# Patient Record
Sex: MALE | Race: WHITE | NOT HISPANIC OR LATINO | Employment: OTHER | URBAN - METROPOLITAN AREA
[De-identification: names, ages, dates, MRNs, and addresses within clinical notes are randomized per-mention and may not be internally consistent; named-entity substitution may affect disease eponyms.]

---

## 2017-01-13 ENCOUNTER — OFFICE VISIT (OUTPATIENT)
Dept: OPHTHALMOLOGY | Facility: CLINIC | Age: 72
End: 2017-01-13
Attending: OPHTHALMOLOGY
Payer: COMMERCIAL

## 2017-01-13 DIAGNOSIS — H40.9 GLAUCOMA: Primary | ICD-10-CM

## 2017-01-13 DIAGNOSIS — H40.1132 PRIMARY OPEN ANGLE GLAUCOMA OF BOTH EYES, MODERATE STAGE: ICD-10-CM

## 2017-01-13 PROCEDURE — 99213 OFFICE O/P EST LOW 20 MIN: CPT | Mod: ZF

## 2017-01-13 PROCEDURE — 92020 GONIOSCOPY: CPT | Mod: ZF | Performed by: OPHTHALMOLOGY

## 2017-01-13 PROCEDURE — 92133 CPTRZD OPH DX IMG PST SGM ON: CPT | Mod: ZF | Performed by: OPHTHALMOLOGY

## 2017-01-13 ASSESSMENT — VISUAL ACUITY
METHOD: SNELLEN - LINEAR
CORRECTION_TYPE: GLASSES
OD_CC: 20/20
OS_CC: 20/20

## 2017-01-13 ASSESSMENT — PACHYMETRY
OD_CT(UM): 552
EXAM_DATE: 1/13/2017
OS_CT(UM): 539

## 2017-01-13 ASSESSMENT — CONF VISUAL FIELD
OD_NORMAL: 1
OS_NORMAL: 1

## 2017-01-13 ASSESSMENT — TONOMETRY
OD_IOP_MMHG: 12
IOP_METHOD: TONOPEN
IOP_METHOD: APPLANATION
OS_IOP_MMHG: 13
OD_IOP_MMHG: 09
OS_IOP_MMHG: 12

## 2017-01-13 ASSESSMENT — REFRACTION_WEARINGRX
OD_CYLINDER: +0.50
OS_AXIS: 80
OD_SPHERE: -2.50
OD_ADD: +2.50
OS_SPHERE: -3.00
OD_AXIS: 100
OS_ADD: +2.50
OS_CYLINDER: +0.75

## 2017-01-13 ASSESSMENT — CUP TO DISC RATIO
OD_RATIO: 0.8
OS_RATIO: 0.75

## 2017-01-13 ASSESSMENT — SLIT LAMP EXAM - LIDS
COMMENTS: NORMAL
COMMENTS: NORMAL

## 2017-01-13 ASSESSMENT — EXTERNAL EXAM - RIGHT EYE: OD_EXAM: NORMAL

## 2017-01-13 ASSESSMENT — EXTERNAL EXAM - LEFT EYE: OS_EXAM: NORMAL

## 2017-01-13 NOTE — PROGRESS NOTES
CC: glaucoma evaluation     HPI: Jhonatan Hewitt is a 71 year old male referred by Dr. Duran here for glaucoma evaluation. Was told he had glaucoma x 15 years and has been on drops x 15 years. Told that his glaucoma has always been fairly stable.   Last VF in Nov 2016 showed OD: nasal step, and inferior arcuate close to fixation; OS: fairly normal but missed a couple of points on last two visual fields.     Highest IOP (per patient): high teens/low 20s.     Brother had glaucoma (on gtts), several first cousins, and some uncles     Past medical hx:   HTN, HLP    Past ocular hx:   CE/IOL with endoscopic CPC 09/2014o right eye     Ocular gtts:   Dorzolamide/timolol BID OU  Brimonidine BID OU  Latanoprost qhs OU     Intolerances: none     OCT RNFL:  OD: PPA, superior and inferior thinning, epiretinal membrane   OS: PPA, inferior thinning       ASSESSMENT/PLAN:   1) POAG, moderate-severe right eye; mild-moderate left eye    - excellent IOPs at 12 OU today   - will continue same drops for now   - will follow up with Dr Duran as before    2) Pseudophakia, right eye   - open posterior cap     3) Nuclear sclerosis, left eye    - not visually significant, continue to observe   - could have cataract extraction if intraocular pressure becomes difficult to control    4) Myopic astigmatism, both eyes    - current Rx up to date.       Jaskaran Baker MD  Ophthalmology resident, PGY-3     Attending Physician Attestation:  Complete documentation of historical and exam elements from today's encounter can be found in the full encounter summary report (not reduplicated in this progress note). I personally obtained the chief complaint(s) and history of present illness. I confirmed and edited asnecessary the review of systems, past medical/surgical history, family history, social history, and examination findings as documented by others; and I examined the patient myself. I personally reviewed the relevant tests, images, and reports  as documented above. I formulated and edited as necessary the assessment and plan and discussed the findings and management plan with the patient and family.  - Tory Ramirez MD 9:42 AM 1/13/2017

## 2017-01-13 NOTE — NURSING NOTE
Chief Complaints and History of Present Illnesses   Patient presents with     Consult For     glaucoma     HPI    Affected eye(s):  Both   Symptoms:        Frequency:  Constant       Do you have eye pain now?:  No      Comments:  Has been on gtts for a long time and states that he is at the highest dose of gtts  States no changes in the va  +watery  Kelsey Ronnaon COT 7:40 AM January 13, 2017

## 2017-02-16 ENCOUNTER — THERAPY VISIT (OUTPATIENT)
Dept: PHYSICAL THERAPY | Facility: CLINIC | Age: 72
End: 2017-02-16
Payer: COMMERCIAL

## 2017-02-16 DIAGNOSIS — M25.512 BILATERAL SHOULDER PAIN: Primary | ICD-10-CM

## 2017-02-16 DIAGNOSIS — M25.551 HIP PAIN, RIGHT: ICD-10-CM

## 2017-02-16 DIAGNOSIS — M25.511 BILATERAL SHOULDER PAIN: Primary | ICD-10-CM

## 2017-02-16 PROCEDURE — 97161 PT EVAL LOW COMPLEX 20 MIN: CPT | Mod: GP | Performed by: PHYSICAL THERAPIST

## 2017-02-16 PROCEDURE — 97110 THERAPEUTIC EXERCISES: CPT | Mod: GP | Performed by: PHYSICAL THERAPIST

## 2017-02-16 ASSESSMENT — ACTIVITIES OF DAILY LIVING (ADL)
SITTING_FOR_15_MINUTES: NO DIFFICULTY AT ALL
HEAVY_WORK: MODERATE DIFFICULTY
GETTING_INTO_AND_OUT_OF_A_BATHTUB: SLIGHT DIFFICULTY
PUTTING_ON_SOCKS_AND_SHOES: EXTREME DIFFICULTY
HOS_ADL_ITEM_SCORE_TOTAL: 50
WALKING_15_MINUTES_OR_GREATER: SLIGHT DIFFICULTY
LIGHT_TO_MODERATE_WORK: NO DIFFICULTY AT ALL
HOS_ADL_SCORE(%): 78.12
TWISTING/PIVOTING_ON_INVOLVED_LEG: SLIGHT DIFFICULTY
GOING_DOWN_1_FLIGHT_OF_STAIRS: NO DIFFICULTY AT ALL
WALKING_DOWN_STEEP_HILLS: SLIGHT DIFFICULTY
WALKING_UP_STEEP_HILLS: MODERATE DIFFICULTY
GOING_UP_1_FLIGHT_OF_STAIRS: SLIGHT DIFFICULTY
STANDING_FOR_15_MINUTES: NO DIFFICULTY AT ALL
GETTING_INTO_AND_OUT_OF_AN_AVERAGE_CAR: SLIGHT DIFFICULTY
HOS_ADL_HIGHEST_POTENTIAL_SCORE: 64
WALKING_APPROXIMATELY_10_MINUTES: SLIGHT DIFFICULTY
HOS_ADL_COUNT: 16
ROLLING_OVER_IN_BED: NO DIFFICULTY AT ALL
RECREATIONAL_ACTIVITIES: SLIGHT DIFFICULTY
STEPPING_UP_AND_DOWN_CURBS: SLIGHT DIFFICULTY
WALKING_INITIALLY: SLIGHT DIFFICULTY

## 2017-02-16 NOTE — MR AVS SNAPSHOT
After Visit Summary   2/16/2017    Jhonatan Hewitt    MRN: [id number]           Patient Information     Date Of Birth          1945        Visit Information        Provider Department      2/16/2017 11:40 AM Keyon Mehta, PT [facility]        Today's Diagnoses     Bilateral shoulder pain    -  1    Hip pain, right           Follow-ups after your visit        Your next 10 appointments already scheduled     Feb 23, 2017 11:00 AM CST   [facility] Extremity with Keyon Mehta, PT   [facility] ([facility] [place])    [address]  [place] [address]   [phone]            Mar 02, 2017 11:00 AM CST   VIKKI Extremity with Keyon Mehta, PT   [facility] ([facility] [place])    [address]  [place] [address]   [phone]            Mar 09, 2017 11:00 AM CST   VIKKI Extremity with Keyon Mehta, PT   [facility] [place] [facility] ([facility] [place])    [address]  [place] [address]   [phone]            Mar 16, 2017  1:30 PM CDT   VIKKI Extremity with Keyon Mehta, PT   [facility] [place] [facility] ([facility] [place])    [address]  [place] [address]   [phone]              Who to contact     If you have questions or need follow up information about today's clinic visit or your schedule please contact [facility] directly at [phone].  Normal or non-critical lab and imaging results will be communicated to you by MyChart, letter or phone within 4 business days after the clinic has received the results. If you do not hear from us within 7 days, please contact the clinic through MyChart or phone. If you have a critical or abnormal lab result, we will notify you by phone as soon as possible.  Submit refill requests through [employer] or call your pharmacy and they will forward the refill  "request to us. Please allow 3 business days for your refill to be completed.          Additional Information About Your Visit        MyChart Information     [employer] lets you send messages to your doctor, view your test results, renew your prescriptions, schedule appointments and more. To sign up, go to [email or web address] . Click on \"Log in\" on the left side of the screen, which will take you to the Welcome page. Then click on \"Sign up Now\" on the right side of the page.     You will be asked to enter the access code listed below, as well as some personal information. Please follow the directions to create your username and password.     Your access code is: [email or web address]  Expires: 3/30/2017  8:30 AM     Your access code will  in 90 days. If you need help or a new code, please call your [facility] clinic or [phone].        Care EveryWhere ID     This is your Care EveryWhere ID. This could be used by other organizations to access your [facility] medical records  [id number]         Blood Pressure from Last 3 Encounters:   14 (!) 131/92   14 120/74    Weight from Last 3 Encounters:   14 112 kg (247 lb)   14 111.4 kg (245 lb 11.2 oz)              We Performed the Following     HC PT EVAL, LOW COMPLEXITY     [facility] INITIAL EVAL REPORT     THERAPEUTIC EXERCISES        Primary Care Provider    Doctor Unknown, MD       No address on file        Thank you!     Thank you for choosing [facility]  for your care. Our goal is always to provide you with excellent care. Hearing back from our patients is one way we can continue to improve our services. Please take a few minutes to complete the written survey that you may receive in the mail after your visit with us. Thank you!             Your Updated Medication List - Protect others around you: Learn how to safely use, store and throw away your medicines at www.disposemymeds.org.          This list is accurate as " of: 2/16/17 12:52 PM.  Always use your most recent med list.                   Brand Name Dispense Instructions for use    ascorbic acid 500 MG Cpcr CR capsule    vitamin C     Take 1,000 mg by mouth daily       ASPIRIN PO      Take 81 mg by mouth daily       atorvastatin 40 MG tablet    LIPITOR         brimonidine 0.2 % ophthalmic solution    ALPHAGAN     1 drop 2 times daily       CIALIS 20 MG tablet   Generic drug:  tadalafil      Four tiems per elias , once weekly       dorzolamide-timolol 2-0.5 % ophthalmic solution    COSOPT         doxycycline 100 MG capsule    VIBRAMYCIN     2 times daily       latanoprost 0.005 % ophthalmic solution    XALATAN         losartan-hydrochlorothiazide 50-12.5 MG per tablet    HYZAAR         metroNIDAZOLE 0.75 % topical gel    METROGEL         vitamin D 42034 UNIT capsule    ERGOCALCIFEROL     twice a week

## 2017-02-16 NOTE — PROGRESS NOTES
[facility] Initial Evaluation      Subjective:    Jhonatan Hewitt is a 71 year old male with a bilateral shoulders condition.  Condition occurred with:  Unknown cause.  Condition occurred: for unknown reasons.  This is a chronic condition  Patient reports a gradual worsening of shoulder weakness beginning several months ago, left is worse than right.  He has had bilateral  rotator cuff repairs in 2002 and 2000.  He c/o difficulty reaching and lifting above shoulder level.  Physical therapy was ordered on 2/14/2017.    Site of Pain: no c/o pain.        Associated symptoms:  Loss of motion/stiffness and loss of strength. Pain is the same all the time.  Symptoms are exacerbated by using arm at shoulder level, using arm overhead and lifting and relieved by nothing.  Since onset symptoms are gradually worsening.  Special testing: none.  Previous treatment: none.    General health as reported by patient is good.                        Jhonatan Hewitt is a 71 year old male with a right hip condition.  Condition occurred with:  Insidious onset.  Condition occurred: for unknown reasons.  This is a new condition  Patient reports a gradual worsening of right hip pain and decreasing ROM.  He cannot put on his own socks.  Left hip was replaced in 2002.  Physical therapy ordered on 2/14/2017.    Patient reports pain:  Joint.    Pain is described as aching and is intermittent and reported as 2/10.  Associated symptoms:  Loss of motion/stiffness and loss of strength. Pain is the same all the time.  Symptoms are exacerbated by activity, certain positions and other (dressing) and relieved by rest.  Since onset symptoms are gradually worsening.  Special tests:  X-ray (none).  Previous treatment: none.    General health as reported by patient is good.                                            Objective:    System                   Shoulder Evaluation:  ROM:  AROM:    Flexion:  Left:  135    Right:  145    Abduction:   Left: 80   Right:  145      External Rotation:  Left:  15    Right:  60            Extension/Internal Rotation:  Left:  T12    Right:  L2    PROM:              External Rotation:  Left:  60                        Strength:        Abduction:  Left: 3/5  Pain:    Right: 5-/5     Pain:  Adduction:  Left: 5/5    Pain:    Right: 5/5     Pain:  Internal Rotation:  Left:5-/5     Pain:    Right: 5-/5     Pain:  External Rotation:   Left:2+/5     Pain:   Right:4+/5     Pain:              Special Tests:    Left shoulder positive for the following special tests:  Rotator cuff tear                              Hip Evaluation  Hip PROM:    Flexion: Left:  Right: 90    Abduction: Left:   Right: 25    Internal Rotation: Left:   Right: <5  External Rotation: Left:   Right: 35        Endfeel: Firm      Hip Strength:        Abduction:  Right: 4-/5   -   Pain:                                 General     ROS    Assessment/Plan:      Patient is a 71 year old male with both sides shoulder complaints.    Patient has the following significant findings with corresponding treatment plan.                Diagnosis:  Shoulder RCT, Hip OA/DJD  Pain -  self management, education and home program  Decreased ROM/flexibility - manual therapy, therapeutic exercise, therapeutic activity and home program  Decreased joint mobility - therapeutic exercise, therapeutic activity and home program  Decreased strength - therapeutic exercise, therapeutic activities and home program  Impaired muscle performance - neuro re-education and home program  Decreased function - therapeutic activities and home program    Therapy Evaluation Codes:   1) History comprised of:   Personal factors that impact the plan of care:      Age.    Comorbidity factors that impact the plan of care are:      Cancer, High blood pressure, Implanted device, Osteoarthritis, Smoking, Stroke and Weakness.     Medications impacting care: Anti-inflammatory and High blood  pressure.  2) Examination of Body Systems comprised of:   Body structures and functions that impact the plan of care:      Hip and Shoulder.   Activity limitations that impact the plan of care are:      Dressing and Lifting.  3) Clinical presentation characteristics are:   Stable/Uncomplicated.  4) Decision-Making    Low complexity using standardized patient assessment instrument and/or measureable assessment of functional outcome.  Cumulative Therapy Evaluation is: Low complexity.    Previous and current functional limitations:  (See Goal Flow Sheet for this information)    Short term and Long term goals: (See Goal Flow Sheet for this information)     Communication ability:  Patient appears to be able to clearly communicate and understand verbal and written communication and follow directions correctly.  Treatment Explanation - The following has been discussed with the patient:   RX ordered/plan of care  Anticipated outcomes  Possible risks and side effects  This patient would benefit from PT intervention to resume normal activities.   Rehab potential is good.    Frequency:  1 X week, once daily  Duration:  for 8 weeks  Discharge Plan:  Achieve all LTG.  Independent in home treatment program.  Reach maximal therapeutic benefit.    Please refer to the daily flowsheet for treatment today, total treatment time and time spent performing 1:1 timed codes.

## 2017-02-23 ENCOUNTER — THERAPY VISIT (OUTPATIENT)
Dept: PHYSICAL THERAPY | Facility: CLINIC | Age: 72
End: 2017-02-23
Payer: COMMERCIAL

## 2017-02-23 DIAGNOSIS — M25.511 BILATERAL SHOULDER PAIN: ICD-10-CM

## 2017-02-23 DIAGNOSIS — M25.512 BILATERAL SHOULDER PAIN: ICD-10-CM

## 2017-02-23 DIAGNOSIS — M25.551 HIP PAIN, RIGHT: ICD-10-CM

## 2017-02-23 PROCEDURE — 97112 NEUROMUSCULAR REEDUCATION: CPT | Mod: GP | Performed by: PHYSICAL THERAPIST

## 2017-02-23 PROCEDURE — 97110 THERAPEUTIC EXERCISES: CPT | Mod: GP | Performed by: PHYSICAL THERAPIST

## 2017-03-02 ENCOUNTER — THERAPY VISIT (OUTPATIENT)
Dept: PHYSICAL THERAPY | Facility: CLINIC | Age: 72
End: 2017-03-02
Payer: COMMERCIAL

## 2017-03-02 DIAGNOSIS — M25.512 BILATERAL SHOULDER PAIN: ICD-10-CM

## 2017-03-02 DIAGNOSIS — M25.551 HIP PAIN, RIGHT: ICD-10-CM

## 2017-03-02 DIAGNOSIS — M25.511 BILATERAL SHOULDER PAIN: ICD-10-CM

## 2017-03-02 PROCEDURE — 97110 THERAPEUTIC EXERCISES: CPT | Mod: GP | Performed by: PHYSICAL THERAPIST

## 2017-03-02 PROCEDURE — 97112 NEUROMUSCULAR REEDUCATION: CPT | Mod: GP | Performed by: PHYSICAL THERAPIST

## 2017-03-09 ENCOUNTER — THERAPY VISIT (OUTPATIENT)
Dept: PHYSICAL THERAPY | Facility: CLINIC | Age: 72
End: 2017-03-09
Payer: COMMERCIAL

## 2017-03-09 DIAGNOSIS — M25.511 BILATERAL SHOULDER PAIN: ICD-10-CM

## 2017-03-09 DIAGNOSIS — M25.512 BILATERAL SHOULDER PAIN: ICD-10-CM

## 2017-03-09 DIAGNOSIS — M25.551 HIP PAIN, RIGHT: ICD-10-CM

## 2017-03-09 PROCEDURE — 97110 THERAPEUTIC EXERCISES: CPT | Mod: GP | Performed by: PHYSICAL THERAPIST

## 2017-03-09 PROCEDURE — 97112 NEUROMUSCULAR REEDUCATION: CPT | Mod: GP | Performed by: PHYSICAL THERAPIST

## 2017-03-16 ENCOUNTER — THERAPY VISIT (OUTPATIENT)
Dept: PHYSICAL THERAPY | Facility: CLINIC | Age: 72
End: 2017-03-16
Payer: COMMERCIAL

## 2017-03-16 DIAGNOSIS — M25.551 HIP PAIN, RIGHT: ICD-10-CM

## 2017-03-16 DIAGNOSIS — M25.512 BILATERAL SHOULDER PAIN: ICD-10-CM

## 2017-03-16 DIAGNOSIS — M25.511 BILATERAL SHOULDER PAIN: ICD-10-CM

## 2017-03-16 PROCEDURE — 97110 THERAPEUTIC EXERCISES: CPT | Mod: GP | Performed by: PHYSICAL THERAPIST

## 2017-03-16 PROCEDURE — 97112 NEUROMUSCULAR REEDUCATION: CPT | Mod: GP | Performed by: PHYSICAL THERAPIST

## 2017-03-29 ENCOUNTER — THERAPY VISIT (OUTPATIENT)
Dept: PHYSICAL THERAPY | Facility: CLINIC | Age: 72
End: 2017-03-29
Payer: COMMERCIAL

## 2017-03-29 DIAGNOSIS — M25.511 BILATERAL SHOULDER PAIN: ICD-10-CM

## 2017-03-29 DIAGNOSIS — M25.551 HIP PAIN, RIGHT: ICD-10-CM

## 2017-03-29 DIAGNOSIS — M25.512 BILATERAL SHOULDER PAIN: ICD-10-CM

## 2017-03-29 PROCEDURE — 97110 THERAPEUTIC EXERCISES: CPT | Mod: GP | Performed by: PHYSICAL THERAPIST

## 2017-04-05 ENCOUNTER — THERAPY VISIT (OUTPATIENT)
Dept: PHYSICAL THERAPY | Facility: CLINIC | Age: 72
End: 2017-04-05
Payer: COMMERCIAL

## 2017-04-05 DIAGNOSIS — M25.551 HIP PAIN, RIGHT: ICD-10-CM

## 2017-04-05 DIAGNOSIS — M25.512 BILATERAL SHOULDER PAIN: ICD-10-CM

## 2017-04-05 DIAGNOSIS — M25.511 BILATERAL SHOULDER PAIN: ICD-10-CM

## 2017-04-05 PROCEDURE — 97112 NEUROMUSCULAR REEDUCATION: CPT | Mod: GP | Performed by: PHYSICAL THERAPIST

## 2017-04-05 PROCEDURE — 97110 THERAPEUTIC EXERCISES: CPT | Mod: GP | Performed by: PHYSICAL THERAPIST

## 2017-04-20 ENCOUNTER — THERAPY VISIT (OUTPATIENT)
Dept: PHYSICAL THERAPY | Facility: CLINIC | Age: 72
End: 2017-04-20
Payer: COMMERCIAL

## 2017-04-20 DIAGNOSIS — M25.512 BILATERAL SHOULDER PAIN: ICD-10-CM

## 2017-04-20 DIAGNOSIS — M25.551 HIP PAIN, RIGHT: ICD-10-CM

## 2017-04-20 DIAGNOSIS — M25.511 BILATERAL SHOULDER PAIN: ICD-10-CM

## 2017-04-20 PROCEDURE — 97112 NEUROMUSCULAR REEDUCATION: CPT | Mod: GP | Performed by: PHYSICAL THERAPIST

## 2017-04-20 PROCEDURE — 97110 THERAPEUTIC EXERCISES: CPT | Mod: GP | Performed by: PHYSICAL THERAPIST

## 2017-04-20 NOTE — PROGRESS NOTES
Subjective:    HPI                    Objective:    System    Physical Exam    General     ROS    Assessment/Plan:      PROGRESS  REPORT    Progress reporting period is from 2/16/2017 to 4/20/2017.       SUBJECTIVE  Subjective changes noted by patient:  Patient reports minimal overall progress since beginning therapy.  Patient was making steady progress until an acute flare-up of low back a few weeks which significant impaired his ability to move and perform the home exercise.  Low back pain is improving but persists.  He was using a cane following flare-up, but he not longer uses it.  Current pain level is - no c/o shoulder pain, mild hip pain, significant low back pain.     Previous pain level was 0/10 shoulder, 2/10 hip  Changes in function:  None  Adverse reaction to treatment or activity: None    OBJECTIVE    Shoulder:  AROM:   Flexion: Left: 135 Right: 145  Abduction: Left: 80 Right: 145  External Rotation: Left: 15 Right: 60  Extension/Internal Rotation: Left: T12 Right: L2     PROM:   External Rotation: Left: 60      Strength:   Abduction: Left: 3/5 Pain: Right: 5-/5 Pain:  Adduction: Left: 5/5 Pain: Right: 5/5 Pain:  Internal Rotation: Left:5-/5 Pain: Right: 5-/5 Pain:  External Rotation: Left:2+/5 Pain: Right:4+/5 Pain:     Hip PROM:   Flexion: Left: Right: 90  Abduction: Left: Right: 25  Internal Rotation: Left: Right: <5  External Rotation: Left: Right: 35     Endfeel: Firm      Hip Strength:   Abduction: Right: 4-/5      ASSESSMENT/PLAN  Updated problem list and treatment plan: Diagnosis 1:  B Shoulder - RC insufficiency, R Hip DJD  Pain -  self management, education and home program  Decreased ROM/flexibility - therapeutic exercise, therapeutic activity and home program  Decreased joint mobility - therapeutic exercise, therapeutic activity and home program  Decreased strength - therapeutic exercise, therapeutic activities and home program  Impaired gait - gait training and home program  Impaired muscle  performance - neuro re-education and home program  Decreased function - therapeutic activities and home program  STG/LTGs have been met or progress has been made towards goals:  Yes (See Goal flow sheet completed today.)  Assessment of Progress: The patient has had set backs in their progress.  Self Management Plans:  Patient has been instructed in a home treatment program.  Patient is independent in a home treatment program.  I have re-evaluated this patient and find that the nature, scope, duration and intensity of the therapy is appropriate for the medical condition of the patient.  Jhonatan continues to require the following intervention to meet STG and LTG's:  PT    Recommendations:  This patient would benefit from continued therapy.     Frequency:  1 X week, once daily  Duration:  for 6 weeks    Patient to follow-up with orthopedic surgeon.        Please refer to the daily flowsheet for treatment today, total treatment time and time spent performing 1:1 timed codes.

## 2017-04-20 NOTE — LETTER
[facility]  [address]  [address]  [place] [address]  [phone]    2017    Re: Jhonatan Hewitt   :   1945  MRN:  [id number]   REFERRING PHYSICIAN:   Kermit Miller  [facility]  Date of Initial Evaluation:  17  Visits:  Rxs Used: 8  Reason for Referral:     Bilateral shoulder pain  Hip pain, right    PROGRESS  REPORT  Progress reporting period is from 2017 to 2017.     SUBJECTIVE  Subjective changes noted by patient: Patient reports minimal overall progress since beginning therapy.  Patient was making steady progress until an acute flare-up of low back a few weeks which significant impaired his ability to move and perform the home exercise.  Low back pain is improving but persists.  He was using a cane following flare-up, but he not longer uses it.  Current pain level is - no c/o shoulder pain, mild hip pain, significant low back pain.     Previous pain level was 0/10 shoulder, 2/10 hip  Changes in function:  None  Adverse reaction to treatment or activity: None    OBJECTIVE  Shoulder:  AROM:   Flexion: Left: 135 Right: 145  Abduction: Left: 80 Right: 145  External Rotation: Left: 15 Right: 60  Extension/Internal Rotation: Left: T12 Right: L2   PROM:   External Rotation: Left: 60   Strength:   Abduction: Left: 3/5 Pain: Right: 5-/5 Pain:  Adduction: Left: 5/5 Pain: Right: 5/5 Pain:  Internal Rotation: Left:5-/5 Pain: Right: 5-/5 Pain:  External Rotation: Left:2+/5 Pain: Right:4+/5 Pain:   Hip PROM:   Flexion: Left: Right: 90  Abduction: Left: Right: 25  Internal Rotation: Left: Right: <5  External Rotation: Left: Right: 35  Endfeel: Firm  Re: Jhonatan Hewitt   :   1945      Hip Strength:   Abduction: Right: 4-/5    ASSESSMENT/PLAN  Updated problem list and treatment plan: Diagnosis 1:  B Shoulder - RC insufficiency, R Hip DJD  Pain -  self management, education and home program  Decreased ROM/flexibility -  therapeutic exercise, therapeutic activity and home program  Decreased joint mobility - therapeutic exercise, therapeutic activity and home program  Decreased strength - therapeutic exercise, therapeutic activities and home program  Impaired gait - gait training and home program  Impaired muscle performance - neuro re-education and home program  Decreased function - therapeutic activities and home program  STG/LTGs have been met or progress has been made towards goals:  Yes (See Goal flow sheet completed today.)  Assessment of Progress: The patient has had set backs in their progress.  Self Management Plans:  Patient has been instructed in a home treatment program.  Patient is independent in a home treatment program.  I have re-evaluated this patient and find that the nature, scope, duration and intensity of the therapy is appropriate for the medical condition of the patient.  Jhonatan continues to require the following intervention to meet STG and LTG's:  PT    Recommendations:  This patient would benefit from continued therapy.     Frequency:  1 X week, once daily  Duration:  for 6 weeks  Patient to follow-up with orthopedic surgeon.      Thank you for your referral.    INQUIRIES  Therapist: Bhupinder Mehta   [facility] [place]  [address]   [address]  [place] [address]  Phone: [phone]/Fax: [phone]

## 2017-04-27 ENCOUNTER — THERAPY VISIT (OUTPATIENT)
Dept: PHYSICAL THERAPY | Facility: CLINIC | Age: 72
End: 2017-04-27
Payer: COMMERCIAL

## 2017-04-27 DIAGNOSIS — M25.551 HIP PAIN, RIGHT: ICD-10-CM

## 2017-04-27 DIAGNOSIS — M25.512 BILATERAL SHOULDER PAIN: ICD-10-CM

## 2017-04-27 DIAGNOSIS — M25.511 BILATERAL SHOULDER PAIN: ICD-10-CM

## 2017-04-27 PROCEDURE — 97110 THERAPEUTIC EXERCISES: CPT | Mod: GP | Performed by: PHYSICAL THERAPIST

## 2017-04-27 PROCEDURE — 97112 NEUROMUSCULAR REEDUCATION: CPT | Mod: GP | Performed by: PHYSICAL THERAPIST

## 2017-05-04 ENCOUNTER — THERAPY VISIT (OUTPATIENT)
Dept: PHYSICAL THERAPY | Facility: CLINIC | Age: 72
End: 2017-05-04
Payer: COMMERCIAL

## 2017-05-04 DIAGNOSIS — M25.551 HIP PAIN, RIGHT: ICD-10-CM

## 2017-05-04 DIAGNOSIS — M25.511 BILATERAL SHOULDER PAIN: ICD-10-CM

## 2017-05-04 DIAGNOSIS — M25.512 BILATERAL SHOULDER PAIN: ICD-10-CM

## 2017-05-04 PROCEDURE — 97112 NEUROMUSCULAR REEDUCATION: CPT | Mod: GP | Performed by: PHYSICAL THERAPIST

## 2017-05-04 PROCEDURE — 97110 THERAPEUTIC EXERCISES: CPT | Mod: GP | Performed by: PHYSICAL THERAPIST

## 2017-05-11 ENCOUNTER — THERAPY VISIT (OUTPATIENT)
Dept: PHYSICAL THERAPY | Facility: CLINIC | Age: 72
End: 2017-05-11
Payer: COMMERCIAL

## 2017-05-11 DIAGNOSIS — M25.511 BILATERAL SHOULDER PAIN: ICD-10-CM

## 2017-05-11 DIAGNOSIS — M25.512 BILATERAL SHOULDER PAIN: ICD-10-CM

## 2017-05-11 DIAGNOSIS — M25.551 HIP PAIN, RIGHT: ICD-10-CM

## 2017-05-11 PROCEDURE — 97110 THERAPEUTIC EXERCISES: CPT | Mod: GP | Performed by: PHYSICAL THERAPIST

## 2017-05-11 PROCEDURE — 97112 NEUROMUSCULAR REEDUCATION: CPT | Mod: GP | Performed by: PHYSICAL THERAPIST

## 2017-05-18 ENCOUNTER — THERAPY VISIT (OUTPATIENT)
Dept: PHYSICAL THERAPY | Facility: CLINIC | Age: 72
End: 2017-05-18
Payer: COMMERCIAL

## 2017-05-18 DIAGNOSIS — M25.551 HIP PAIN, RIGHT: ICD-10-CM

## 2017-05-18 DIAGNOSIS — M25.512 BILATERAL SHOULDER PAIN: ICD-10-CM

## 2017-05-18 DIAGNOSIS — M25.511 BILATERAL SHOULDER PAIN: ICD-10-CM

## 2017-05-18 PROCEDURE — 97110 THERAPEUTIC EXERCISES: CPT | Mod: GP | Performed by: PHYSICAL THERAPIST

## 2017-05-18 PROCEDURE — 97112 NEUROMUSCULAR REEDUCATION: CPT | Mod: GP | Performed by: PHYSICAL THERAPIST

## 2017-06-01 ENCOUNTER — THERAPY VISIT (OUTPATIENT)
Dept: PHYSICAL THERAPY | Facility: CLINIC | Age: 72
End: 2017-06-01
Payer: COMMERCIAL

## 2017-06-01 DIAGNOSIS — M25.551 HIP PAIN, RIGHT: ICD-10-CM

## 2017-06-01 DIAGNOSIS — M25.512 BILATERAL SHOULDER PAIN: ICD-10-CM

## 2017-06-01 DIAGNOSIS — M25.511 BILATERAL SHOULDER PAIN: ICD-10-CM

## 2017-06-01 PROCEDURE — 97110 THERAPEUTIC EXERCISES: CPT | Mod: GP | Performed by: PHYSICAL THERAPIST

## 2017-06-01 PROCEDURE — 97112 NEUROMUSCULAR REEDUCATION: CPT | Mod: GP | Performed by: PHYSICAL THERAPIST

## 2017-07-08 ENCOUNTER — TELEPHONE (OUTPATIENT)
Dept: NURSING | Facility: CLINIC | Age: 72
End: 2017-07-08

## 2017-07-08 ENCOUNTER — OFFICE VISIT (OUTPATIENT)
Dept: URGENT CARE | Facility: URGENT CARE | Age: 72
End: 2017-07-08
Payer: COMMERCIAL

## 2017-07-08 ENCOUNTER — NURSE TRIAGE (OUTPATIENT)
Dept: NURSING | Facility: CLINIC | Age: 72
End: 2017-07-08

## 2017-07-08 VITALS
SYSTOLIC BLOOD PRESSURE: 130 MMHG | WEIGHT: 254.1 LBS | DIASTOLIC BLOOD PRESSURE: 82 MMHG | TEMPERATURE: 98.9 F | HEART RATE: 90 BPM | BODY MASS INDEX: 34.46 KG/M2 | OXYGEN SATURATION: 95 %

## 2017-07-08 DIAGNOSIS — R04.0 EPISTAXIS: Primary | ICD-10-CM

## 2017-07-08 PROCEDURE — 99213 OFFICE O/P EST LOW 20 MIN: CPT | Performed by: FAMILY MEDICINE

## 2017-07-08 NOTE — TELEPHONE ENCOUNTER
Afrin is an over the counter medication.  No prescription is needed. Please call the patient that Afrin does not need a Rx.  He can buy this medication at the [facility] pharmacy.    Jerman Gonsalez MD

## 2017-07-08 NOTE — MR AVS SNAPSHOT
After Visit Summary   7/8/2017    Jhonatan Hewitt    MRN: [id number]           Patient Information     Date Of Birth          1945        Visit Information        Provider Department      7/8/2017 12:15 PM Jaime Redman MD [facility]        Today's Diagnoses     Epistaxis    -  1      Care Instructions    If continuing to have more episodes, please come in or see Primary care provider for possible cauterization    Afrin nasal spray or oxymetazoline - apply this on gauze or tissue to help stop acute bleeds    At night time, use q tip to apply vaseline to inside middle of nose to help moisturize the area      Nosebleed (Adult)    Bleeding from the nose most commonly occurs because of injury or drying and cracking of the inner lining of the nose. Most nosebleeds are because of dry air or nose-picking. They can occur during a common cold or an allergy attack. They can also occur on a very hot day, or from dry air in the winter.  If the bleeding site is found, it may be cauterized. This means it is treated to cause a blood clot to form. This may be done with a chemical, heat, or electricity. If the bleeding continues after the site is cauterized, or if the site cannot be found, packing may be put in your nose. This is to apply pressure and stop the bleeding. The packing may be made of gauze or sponge. A small balloon catheter is sometimes used. These must be removed by your doctor. Some types of packing dissolve on their own.  Home care    If packing was put in your nose, unless told otherwise, do not pull on it or try to remove it yourself. You will be given an appointment to have it removed. You may also have been given antibiotics to prevent a sinus infection. If so, finish all of the medicine.    Do not blow your nose for 12 hours after the bleeding stops. This will allow a strong blood clot to form. Do not pick your nose. This may restart bleeding.    Avoid drinking alcohol  and hot liquids for the next 2 days. Alcohol or hot liquids in your mouth can dilate blood vessels in your nose. This can cause bleeding to start again.    Do not take ibuprofen, naproxen, or medicines that contain aspirin. These thin the blood and may cause your nose to bleed. You may take acetaminophen for pain, unless another pain medicine was prescribed.    If the bleeding starts again, sit up and lean forward to prevent swallowing blood. Pinch your nose tightly on both sides, as shown above, for 10 to 15 minutes. Time yourself. Don t release the pressure on your nose until 10 minutes is up. If bleeding does not stop, continue to pinch your nose and call your healthcare provider or return to this facility.    If you have a cold, allergies, or dry nasal membranes, lubricate the nasal passages. Apply a small amount of petroleum jelly inside the nose with a cotton swab twice a day (morning and night).    Avoid overheating your home. This can dry the air and make your condition worse.    Put a humidifier in the room where you sleep. This will add moisture to the air.    Use a saline nasal spray to keep nasal passages moist.    Do not pick your nose. Keep fingernails trimmed to decrease risk of bleeds.    Do not smoke.  Follow-up care  Follow up with your healthcare provider, or as advised. Nasal packing should be rechecked or removed within 2 to 3 days.  When to seek medical advice  Call your healthcare provider right away if any of these occur.    You have another nosebleed that you cannot control    Dizziness, weakness, or fainting    You become tired or confused    Fever of 100.4 F (38 C) or higher, or as directed by your healthcare provider    Headache    Sinus or facial pain    Shortness of breath or trouble breathing  Date Last Reviewed: 3/22/2015    3644-9062 [employer]. [address] [place] [address], [place] [address]. All rights reserved. This information is not intended as a substitute for  "professional medical care. Always follow your healthcare professional's instructions.                      Follow-ups after your visit        Who to contact     If you have questions or need follow up information about today's clinic visit or your schedule please contact [facility] directly at [phone].  Normal or non-critical lab and imaging results will be communicated to you by MyChart, letter or phone within 4 business days after the clinic has received the results. If you do not hear from us within 7 days, please contact the clinic through [employer] or phone. If you have a critical or abnormal lab result, we will notify you by phone as soon as possible.  Submit refill requests through [email or web address] or call your pharmacy and they will forward the refill request to us. Please allow 3 business days for your refill to be completed.          Additional Information About Your Visit        MyChart Information     [email or web address] lets you send messages to your doctor, view your test results, renew your prescriptions, schedule appointments and more. To sign up, go to [email or web address] . Click on \"Log in\" on the left side of the screen, which will take you to the Welcome page. Then click on \"Sign up Now\" on the right side of the page.     You will be asked to enter the access code listed below, as well as some personal information. Please follow the directions to create your username and password.     Your access code is: [email or web address]  Expires: 10/6/2017 12:47 PM     Your access code will  in 90 days. If you need help or a new code, please call your [place] clinic or [phone].        Care EveryWhere ID     This is your Care EveryWhere ID. This could be used by other organizations to access your [place] medical records  [id number]        Your Vitals Were     Pulse Temperature Pulse Oximetry BMI (Body Mass Index)          90 98.9  F (37.2  C) (Tympanic) 95% 34.46 kg/m2         Blood Pressure from " Last 3 Encounters:   07/08/17 130/82   09/25/14 (!) 131/92   08/04/14 120/74    Weight from Last 3 Encounters:   07/08/17 254 lb 1.6 oz (115.3 kg)   09/23/14 247 lb (112 kg)   08/04/14 245 lb 11.2 oz (111.4 kg)              Today, you had the following     No orders found for display       Primary Care Provider    Doctor Unknown, MD       No address on file        Equal Access to Services     [facility] : Hadii aad ku hadasho Soomaali, waaxda luqadaha, qaybta kaalmada adeegyada, waxay idiin hayirenen jitendraeg huberandrewkalyn will . So [facility] [phone].    ATENCIÓN: Si habla español, tiene a stein disposición servicios gratuitos de asistencia lingüística. [facility] [phone].    We comply with applicable federal civil rights laws and [place] laws. We do not discriminate on the basis of race, color, national origin, age, disability sex, sexual orientation or gender identity.            Thank you!     Thank you for choosing [facility]  for your care. Our goal is always to provide you with excellent care. Hearing back from our patients is one way we can continue to improve our services. Please take a few minutes to complete the written survey that you may receive in the mail after your visit with us. Thank you!             Your Updated Medication List - Protect others around you: Learn how to safely use, store and throw away your medicines at www.disposemymeds.org.          This list is accurate as of: 7/8/17 12:49 PM.  Always use your most recent med list.                   Brand Name Dispense Instructions for use Diagnosis    ascorbic acid 500 MG Cpcr CR capsule    vitamin C     Take 1,000 mg by mouth daily        ASPIRIN PO      Take 81 mg by mouth daily        atorvastatin 40 MG tablet    LIPITOR          brimonidine 0.2 % ophthalmic solution    ALPHAGAN     1 drop 2 times daily        CIALIS 20 MG tablet   Generic drug:  tadalafil      Four tiems per elias , once weekly        dorzolamide-timolol 2-0.5 %  ophthalmic solution    COSOPT          doxycycline 100 MG capsule    VIBRAMYCIN     2 times daily        latanoprost 0.005 % ophthalmic solution    XALATAN          losartan-hydrochlorothiazide 50-12.5 MG per tablet    HYZAAR          metroNIDAZOLE 0.75 % topical gel    METROGEL          vitamin D 66622 UNIT capsule    ERGOCALCIFEROL     twice a week

## 2017-07-08 NOTE — PROGRESS NOTES
Nursing Evaluation:   There are no exam notes on file for this visit.      Subjective:   Jhonatan Hewitt is a 71 year old male who presents for   Chief Complaint   Patient presents with     Epistaxis     x 1 hr has tried ice and pressure to the nose, no injury to the nose, is on vinny aspirin   .   Patients states that main concern today is for a nose bleed which started approximately one hour ago. No luck with applying pressure to the nose. Denies injury. Takes 325 mg of aspirin otherwise no other blood thinners. No recent previous episodes of nose bleed. Has not tried afrin nasal spray. Episode happened suddenly while at the breakfast table. No sneezing or picking of nose that may have started the bleeding.     PMHX/PSHX/MEDS/ALLERGIES/SHX/FHX reviewed and updated in Epic.    Patient Active Problem List    Diagnosis Date Noted     Bilateral shoulder pain 02/16/2017     Priority: Medium     Hip pain, right 02/16/2017     Priority: Medium     Urinary urgency 08/04/2014     Priority: Medium       Current Outpatient Prescriptions   Medication     ASPIRIN PO     ascorbic acid (VITAMIN C) 500 MG CPCR     brimonidine (ALPHAGAN) 0.2 % ophthalmic solution     atorvastatin (LIPITOR) 40 MG tablet     losartan-hydrochlorothiazide (HYZAAR) 50-12.5 MG per tablet     doxycycline (VIBRAMYCIN) 100 MG capsule     metroNIDAZOLE (METROGEL) 0.75 % gel     vitamin D (ERGOCALCIFEROL) 56550 UNIT capsule     CIALIS 20 MG tablet     dorzolamide-timolol (COSOPT) 2-0.5 % ophthalmic solution     latanoprost (XALATAN) 0.005 % ophthalmic solution     No current facility-administered medications for this visit.        ROS  As above per HPI    Objective:   /82  Pulse 90  Temp 98.9  F (37.2  C) (Tympanic)  Wt 254 lb 1.6 oz (115.3 kg)  SpO2 95%  BMI 34.46 kg/m2, Body mass index is 34.46 kg/(m^2).  Gen:  NAD, well-nourished, sitting in chair comfortably  HEET: PERRLA;septum midline oropharynx pink and moist  Neck: supple without  lymphadenopathy, trachea midline  CV:  Hemodynamically stable, cap refill < 2 seconds peripherally  Pulm:  CTAB, no wheezes/rales/rhonchi, no increased work of breathing   ABD: non distended, soft  Extrem: no cyanosis, edema or clubbing  Skin: no obvious rashes or abnormalities  Psych: Euthymic, linear thoughts, normal rate of speech  NOSE: erythema of middle anterior nose with crusting of blood.        Assesment & Plan:   Jhonatan Hewitt, 71 year old male who presents with:    (R04.0) Epistaxis  (primary encounter diagnosis)  Comment: bleeding stopped here after applying gauze with afrin solution. This appears to be a typical anterior nose bleed affecting kiesselbach's plexus. Recommend vaseline apply at night time. If recurrent episodes, come back for cauterization. Recommended afrin nasal solution for future acute episodes that don't resolve with applied pressure for 10 minutes.     Options for treatment and/or follow-up care were reviewed with the patient. Jhonatan Hewitt and/or legal guardian was engaged and actively involved in the decision making process. Patient/guardian verbalized understanding of the options discussed and was satisfied with the final plan.      Jaime Redman MD PGY3  [phone] (Pager)  [facility]

## 2017-07-08 NOTE — PATIENT INSTRUCTIONS
If continuing to have more episodes, please come in or see Primary care provider for possible cauterization    Afrin nasal spray or oxymetazoline - apply this on gauze or tissue to help stop acute bleeds    At night time, use q tip to apply vaseline to inside middle of nose to help moisturize the area      Nosebleed (Adult)    Bleeding from the nose most commonly occurs because of injury or drying and cracking of the inner lining of the nose. Most nosebleeds are because of dry air or nose-picking. They can occur during a common cold or an allergy attack. They can also occur on a very hot day, or from dry air in the winter.  If the bleeding site is found, it may be cauterized. This means it is treated to cause a blood clot to form. This may be done with a chemical, heat, or electricity. If the bleeding continues after the site is cauterized, or if the site cannot be found, packing may be put in your nose. This is to apply pressure and stop the bleeding. The packing may be made of gauze or sponge. A small balloon catheter is sometimes used. These must be removed by your doctor. Some types of packing dissolve on their own.  Home care    If packing was put in your nose, unless told otherwise, do not pull on it or try to remove it yourself. You will be given an appointment to have it removed. You may also have been given antibiotics to prevent a sinus infection. If so, finish all of the medicine.    Do not blow your nose for 12 hours after the bleeding stops. This will allow a strong blood clot to form. Do not pick your nose. This may restart bleeding.    Avoid drinking alcohol and hot liquids for the next 2 days. Alcohol or hot liquids in your mouth can dilate blood vessels in your nose. This can cause bleeding to start again.    Do not take ibuprofen, naproxen, or medicines that contain aspirin. These thin the blood and may cause your nose to bleed. You may take acetaminophen for pain, unless another pain medicine was  prescribed.    If the bleeding starts again, sit up and lean forward to prevent swallowing blood. Pinch your nose tightly on both sides, as shown above, for 10 to 15 minutes. Time yourself. Don t release the pressure on your nose until 10 minutes is up. If bleeding does not stop, continue to pinch your nose and call your healthcare provider or return to this facility.    If you have a cold, allergies, or dry nasal membranes, lubricate the nasal passages. Apply a small amount of petroleum jelly inside the nose with a cotton swab twice a day (morning and night).    Avoid overheating your home. This can dry the air and make your condition worse.    Put a humidifier in the room where you sleep. This will add moisture to the air.    Use a saline nasal spray to keep nasal passages moist.    Do not pick your nose. Keep fingernails trimmed to decrease risk of bleeds.    Do not smoke.  Follow-up care  Follow up with your healthcare provider, or as advised. Nasal packing should be rechecked or removed within 2 to 3 days.  When to seek medical advice  Call your healthcare provider right away if any of these occur.    You have another nosebleed that you cannot control    Dizziness, weakness, or fainting    You become tired or confused    Fever of 100.4 F (38 C) or higher, or as directed by your healthcare provider    Headache    Sinus or facial pain    Shortness of breath or trouble breathing  Date Last Reviewed: 3/22/2015    4447-2693 [employer]. [address] [place] [address], [place] [address]. All rights reserved. This information is not intended as a substitute for professional medical care. Always follow your healthcare professional's instructions.

## 2017-07-08 NOTE — TELEPHONE ENCOUNTER
Patient was told he'd have a prescription. Didn't find anything in paperwork and nothing is at the pharmacy.  He thinks it's for Afrin. Was seen for nose bleed.  Please call patient.  Pharmacy is [facility] in [place], [place].   Paulette Ceballos RN-[facility]

## 2018-05-23 NOTE — Clinical Note
2017      Romain Duran MD  [facility]  [address], [address]  [place] [address]  Fax: [phone]      RE: LORELEI HEWITT  : 1945      Dear Dr. Duran:    I had the pleasure of seeing Lorelei Hewitt on 2017 at the [facility].  My exam findings are as follows:      Visual Acuity (Snellen - Linear)      Right Left   Dist cc 20/20 20/20       Correction:  Glasses         Tonometry (Tonopen, 7:49 AM)      Right Left   Pressure 09 13         Tonometry #2 (Applanation, 8:55 AM)      Right Left   Pressure 12 12            Main Ophthalmology Exam     External Exam      Right Left    External Normal Normal      Slit Lamp Exam      Right Left    Lids/Lashes Normal Normal    Conjunctiva/Sclera White and quiet White and quiet    Cornea Clear Clear    Anterior Chamber Deep and quiet Deep and quiet    Iris Round and reactive Round and reactive    Lens Posterior chamber intraocular lens, Open posterior capsule 2+ NS    Vitreous foster ring  Normal      Fundus Exam      Right Left    Disc Inferotemporal notch PPA, rim intact but thin    C/D Ratio 0.8 0.75    Macula Normal Normal    Vessels Normal Normal    Periphery Normal Normal          History and Present Illness:   Lorelei Hewitt is a 71 year old male referred by Dr. Duran here for glaucoma evaluation. Was told he had glaucoma x 15 years and has been on drops x 15 years. Told that his glaucoma has always been fairly stable.   Last VF in 2016 showed OD: nasal step, and inferior arcuate close to fixation; OS: fairly normal but missed a couple of points on last two visual fields.     Highest IOP (per patient): high teens/low 20s.     Brother had glaucoma (on gtts), several first cousins, and some uncles     Past medical hx:   HTN, HLP    Past ocular hx:   CE/IOL with endoscopic CPC 2014o right eye     Ocular gtts:   Dorzolamide/timolol BID OU  Brimonidine BID OU  Latanoprost qhs OU      Intolerances: none     OCT RNFL:  OD: PPA, superior and inferior thinning, epiretinal membrane   OS: PPA, inferior thinning                       Assessment & Plan:   1) POAG, moderate-severe right eye; mild-moderate left eye    - Excellent IOPs at 12 OU today   - Will continue same drops for now   - Will follow up with Dr Duran as before    2) Pseudophakia, right eye   - Open posterior cap     3) Nuclear sclerosis, left eye    - Not visually significant, continue to observe   - Could have cataract extraction if intraocular pressure becomes difficult to control    4) Myopic astigmatism, both eyes    - Current Rx up to date.       Thank you for allowing me to participate in his care.       Sincerely,       Tory Ramirez MD  Glaucoma   [facility]    Enclosed: OCT     No

## 2023-07-07 RX ORDER — NETARSUDIL 0.2 MG/ML
1 SOLUTION/ DROPS OPHTHALMIC; TOPICAL AT BEDTIME
COMMUNITY

## 2023-08-04 NOTE — H&P (VIEW-ONLY)
[facility]      Preoperative Consultation   Jhonatan Hewitt   : 1945   Gender: male    Date of Encounter: 2023    Nursing Notes:   Marshall Chiquis ANSELMO  2023  1:37 PM  Sign at exiting of workspace  Chief Complaint   Patient presents with     Preoperative Exam     Jhonatan Heiwtt is a 77 y.o. male (1945) who presents for preop evaluation undergoing Right Knee Replacement.  Date of Surgery: 8/15/2023  Surgical Specialty: Orthopedic/Spine  Toy Jerry MD - [facility]  Hospital/Surgical Facility: [facility]  Fax number:   Surgery type: inpatient  Primary Physician: Kermit Miller      Health Maintenance Due   Topic Date Due     Hepatitis C screening for age 18-79  Never done     COVID-19 vaccine series (6 - Pfizer series) 04/15/2023     Health maintenance reviewed.    Patient prefers results from today's visit by:  Phone  If a phone call is needed, the preferred number to reach the patient is:  Mobile   Home Phone [phone]   Mobile [phone]   May we leave a detailed message at this number?  yes    Patient presents to the clinic with wife..  Is patient in need of refills in the next 3 months?  no  Is PCP/care team current?  yes    Is patient due for vaccinations today?   Patient informed of vaccine due.     Juanpablo PUENTE CMA([facility])........2023 1:37 PM         History of Present Illness   Jhonatan Hewitt is a 78 y/o M w/ h/o CVA, HTN, OA, DVT and prostate cancer, presenting for clearance for the aforementioned surgery.     Has had issues with contact dermatitis after surgeries in the past. Seen by dermatologist afterward because of some blistering, derm thought it was a hypersensitivity reaction. Dermatology provider did caution that repeat exposures could cause increasingly severe reactions including the potential for anaphylaxis.     Hypoallergenic sheets. No history of issues with anesthesia. History of DVT after hip replacement in the  past. Staying in the hospital overnight at [facility].     Stroke 8/2015, no residual weakness. On daily full ASA.      Review of Systems   A comprehensive review of systems was negative except for items noted in HPI.    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     aspirin 325 mg tablet Take 1 tablet by mouth once daily with a meal.     atorvastatin (LIPITOR) 40 mg tablet Take 1 Tablet (40 mg) by mouth once daily.     brimonidine (ALPHAGAN) 0.2 % ophthalmic solution 1 Drop 2 times daily.     dorzolamide-timoloL (COSOPT) 2-0.5 % ophthalmic solution Place 1 Drop into both eyes 2 times daily.     doxycycline monohydrate (MONODOX) 100 mg capsule 1 capsule 2 times daily.     fluticasone (50 mcg per actuation) nasal solution (FLONASE) Inhale 2 Sprays to both nostrils once daily.     latanoprost (XALATAN) 0.005 % ophthalmic solution 1 Drop at bedtime.     losartan-hydrochlorothiazide, 50-12.5 mg, (HYZAAR) 50-12.5 mg tablet Take 1 Tablet by mouth once daily.     medication order composer Glucosamine HCT 1500 mg Chrondroitin  [id number] IU  Takes daily     multivitamin-folic acid 0.4 mg (MULTIPLE VITAMIN) tablet Take 1 tablet by mouth once daily.     ORDER - MEDICATION ORDER COMPOSER once daily. Calcium 1200 mg plus D 1000 iu     Rhopressa 0.02 % drop INSTILL ONE DROP INTO BOTH EYES AT BEDTIME     No current facility-administered medications for this visit.     Medications have been reviewed by  "me and are current to the best of my knowledge and ability.     Allergies   Allergen Reactions     Augmentin [Amoxicillin-Pot Clavulanate] Rash     Percocet [Oxycodone-Acetaminophen] Rash     Other [Unlisted Allergen (Include Detail In Comments)] Other - Describe In Comment Field     Rodrigo Morocho, per Ede Rosas MD (18)     Latex Contact Dermatitis     Past Surgical History:   . Laterality Date     CATARACT REMOVAL Right      EYE SURGERY Right 2014    cataracts     EYE SURGERY Right 10/15/2016    laser capsulotomy     HIP REPLACEMENT Left 2002     lef hip fracture       MUSCULOSKELETAL PROCEDURE      Shoulder Procedure  bilateral rotator     PROSTATE SURGERY  2009    DaVinci procedure     RADICAL PROSTATECTOMY      Prostatectomy, Suprapubic Radical     TOTAL HIP ARTHROPLASTY Left      Social History     Tobacco Use     Smoking status: Former     Current packs/day: 0.00     Average packs/day: 0.5 packs/day for 10.0 years (5.0 pk-yrs)     Types: Cigarettes, Pipe     Start date: 1975     Quit date: 1985     Years since quittin.6     Smokeless tobacco: Never   Vaping Use     Vaping Use: Never used   Substance Use Topics     Alcohol use: Yes     Alcohol/week: 10.0 standard drinks of alcohol     Types: 10 Standard drinks or equivalent per week     Comment: 8-10 drinks per week     Drug use: No     Family History   Problem Relation Age of Onset     Other Brother         Parkinson's disease     Premature CHD (under age 60) Mother        PAST DIFFICULTY WITH ANESTHESIA: None     Physical Exam   /78 (Cuff Site: Right Arm, Position: Sitting, Cuff Size: Adult Regular)   Pulse 88   Temp 97.6  F (36.4  C) (Tympanic)   Resp 14   Ht 1.854 m (6' 1\")   Wt 113.4 kg (250 lb)   SpO2 93%   BMI 32.98 kg/m   Body mass index is 32.98 kg/m .  General Appearance: Pleasant, alert, appropriate appearance for age. No acute distress  Head Exam: Normocephalic, without obvious abnormality.  Eye " Exam: Normal external eye, erythematous conjunctiva bilaterally. SHIVAM.  Ear Exam: Normal TM's bilaterally. Normal auditory canals and external ears. Non-tender.  Nose Exam: Normal external nose, mucous membranes, and septum.  OroPharynx Exam: Dental hygiene adequate. Normal buccal mucosa. Normal pharynx.  Neck Exam: Supple, no masses or nodes.  Thyroid Exam: No nodules or enlargement.  Chest/Respiratory Exam: Normal chest wall and respirations. Clear to auscultation.  Cardiovascular Exam: Situs inversus. Early systolic murmur, 2/6, heard over R precordium. No gallups.   Gastrointestinal Exam: Soft, nontender, no abnormal masses or organomegaly.  Musculoskeletal Exam: Sitting in wheel chair, ambulates with assist of walker.   Skin: no rash or abnormalities  Neurologic Exam: Grossly nonfocal, though limited by patient's low mobility.   Psychiatric Exam: Alert and oriented, appropriate affect.    EKG: No prior EKG available for comparison. Sinus rhythm with 1st degree AV block. NO ST segment elevation or depression. No evidence of acute ischemic changes.      Assessment / Plan         The Pre-Op Tool    Recommendations      Intermediate Risk Procedure    Risk of CV Complication (RCRI)  1%    Current Cardiac Status  Good exertional capacity ( > 4 mets )    Cardiac History  No history of coronary artery disease           Labs  HGB within last 30 days  Potassium within last 30 days  EKG  Baseline EKG within the last 12 months  CXR  Not indicated    Stress Testing  Not indicated    * Testing recommendations are intended to assist, but not direct, clinical decisions.           Type & Screen should be obtained by Anesthesia only if the risk of transfusion is > 5% for the procedure     *    Hold Losartan / HCTZ the evening before and/or morning of the procedure.  Hold Aspirin for 7 days prior to procedure  Take your other medications as usual prior to the procedure  Hold vitamins and/or supplements for 1 week prior to the  procedure  Hold fish oil for 2 weeks prior to the procedure  Okay to take Acetaminophen (Tylenol) up until the procedure  Hold / avoid NSAIDs (e.g. ibuprofen, naproxen) prior to procedure: 2 days for ibuprofen (Advil) and 4 days for naproxen (Aleve).    * Medication recommendations are not intended to be exhaustive; they are limited to common medications that are potentially dangerous if incorrectly managed          Labs  * Data supports elimination of  routine  laboratory testing in favor of focused,  indicated  testing based on medical co-morbidities. A 2009 study randomized 1061 patients undergoing ambulatory, non-cataract surgery to routine or to indicated testing. Perioperative adverse events were similar (Anesthesia & Analgesia 2009;108:467-75; Anesthesiol. Clin. 2016 Mar;34(1):43-58).  Stress Testing  * Data from high risk patients undergoing major vascular surgery have failed to demonstrate benefit from either preoperative stress testing or prophylactic revascularization. A large, observational study found low risk of major perioperative adverse events in patients able to achieve =4 mets. Taken together with existing knowledge, for patients with known or suspected CAD, our experts recommend preoperative stress testing only if it is indicated regardless of the planned surgery (N Engl J Med 2004;351:2795-80; J Am Julian Cardiol 2014;64:1325-1574; LON 2020;324:274-290;).  Antiplatelet Therapy  * In the 2014 POISE-2 trial, continuation of aspirin in high cardiovascular risk patients undergoing non-cardiac surgery increased the risk for major bleeding without reducing the rate of death, myocardial infarction, or stroke. In most circumstances our experts recommend a 7 day aspirin hold in patients without coronary stents. Continuation of aspirin may be reasonable in patients with high risk coronary artery disease or cerebrovascular disease who are not undergoing high bleeding risk procedures (NEJM 2014;370:1494-503;  SMILEY 2015; Clin Med 2016; 16: 535-40; Anest Analg 2020; 131: 111-23).     Session ID: [id number]  Endnotes and bibliography available upon request: [email or web address]    Labs: Hemoglobin normal. K specimen hemolyzed. Will have patient return for lab recheck.   ECG: Sinus rhythm with 1st degree AV block. No signs of acute ischemic changes.     ICD-10-CM    1. Preoperative clearance  Z01.818 NC READING EKG - NO CHARGE, COMP ONLY     EKG 12 LEAD     NC ECG ROUTINE ECG W/LEAST 12 LDS W/I&R     HEMOGLOBIN     POTASSIUM      2. Hypertension : normotensive today. instructed to hold losartan-hctz day of surgery.  I10       3. Cerebrovascular accident (CVA), unspecified mechanism (HC) : no sequelae. On full ASA. Instructed to hold 7 days prior to procedure.  I63.9       4. Primary osteoarthritis of right knee  M17.11       5. History of DVT (deep vein thrombosis): history of post-op DVT per patient report. Consider prophylactic AC postoperatively.  Z86.718       6. History of atopic dermatitis: history of significant dermatologic reaction after exposure to hospital bedding. Dermatology concerned for hypersensitivity reaction, and discussed with patient and spouse that future reactions could be more severe, potentially evolving into anaphylaxis.  Patient should have hypoallergenic sheets.  Z87.2       7. Malignant tumor of prostate (HC) : s/p prostatectomy. Stable.  C61         Please see above notes regarding specific pre-surgical medical issues.     Patient is cleared, pending tests ordered today, for planned procedure.   Electronically Signed by:   Laura Singh MD    8/4/2023  *Some images could not be shown.

## 2023-08-08 NOTE — PROGRESS NOTES
Discharge plan according to [facility]:       07/07/23 1554   Discharge Planning   Patient/Family Anticipates Transition to home   Concerns to be Addressed all concerns addressed in this encounter   Living Arrangements   People in Home spouse   Type of Residence Private Residence   Is your private residence a single family home or apartment? Single family home   Stair Railings, Within Home, Primary railings safe and in good condition   Once home, are you able to live on one level? Yes   Bathroom Shower/Tub Walk-in shower   Equipment Currently Used at Home none;walker, standard   Support System   Support Systems Spouse/Significant Other   Do you have someone available to stay with you one or two nights once you are home? Yes

## 2023-08-09 RX ORDER — ASPIRIN 325 MG
325 TABLET, DELAYED RELEASE (ENTERIC COATED) ORAL DAILY
Status: ON HOLD | COMMUNITY
End: 2023-08-15

## 2023-08-10 NOTE — PROGRESS NOTES
Right total knee arthroplasty 8/15/23 with Dr. Jerry at [facility].     Patient has had severe reactions to touching hospital linens with past surgeries and hospitalizations. He develops a severe rash on his skin that is in contact to linens at the hospital.   This has been escalated to Majo Munoz, Director of Surgery, and Jessica Angel, Nurse Manager of the Orthopedic unit.  The patient's wife will bring in sheets and linens from home to use on the hospital bed on the orthopedic unit after surgery. The patient using linens from home after surgery has been approved by Jessica Angel.    Helen Diaz RN

## 2023-08-14 ENCOUNTER — ANESTHESIA EVENT (OUTPATIENT)
Dept: SURGERY | Facility: CLINIC | Age: 78
End: 2023-08-14
Payer: COMMERCIAL

## 2023-08-14 NOTE — TREATMENT PLAN
Orthopedic Surgery Pre-Op Plan: Jhonatan Hewitt  pre-op review. This is NOT an H&P   Surgeon: Dr. Jerry   [facility]: [facility]  Name of Surgery: Right Total Knee Arthroplasty  Date of Surgery: 8/15/23  H&P: Completed on 8/4/23 by Dr. Laura Singh at [facility].   History of ASA, NSAIDS, vitamin and/or herbal supplements within 10 days: Yes-  mg daily for history of CVA-patient instructed to hold this for 7 days before surgery.   History of blood thinners: No    Plan:   1) Discharge Plan: Home morning of POD 1 with assist of Spouse. Please see Discharge Planning section near bottom of this note for further details.     2) History of Atopic Dermatitis: with significant reaction- severe rash to previous hospital linens/bedding. Dermatologist concerned for hypersensitivity reaction with future reactions possibly becoming more severe. Paper sheets have been ordered/received by our Director of Surgery for use in ELISHA, OR and PACU for this patient. Patient's wife is bringing in their own sheets from home to use in his hospital bed on the orthopedic unit.     3) History of DVT: reportedly provoked following previous left hip replacement.  No known coagulopathy and is not on chronic anticoagulation but is likely at increased risk for postop VTE given this history.  Will defer to Dr. Jerry's team for decision on post-op VTE prophylaxis given this history.     4) History of CVA: on  mg daily.  Patient instructed to hold ASA for 7 days before surgery.     5) Hyperlipidemia: On atorvastatin.    6) Hypertension: Appears well controlled on losartan-hydrochlorothiazide.  Patient instructed to hold losartan-hydrochlorothiazide on the morning of surgery.    7) History of Prostate Cancer: S/P Prostatectomy in 2009 and radiation therapy in 2011:     Patient appears medically optimized for upcoming surgery. I would recommend Hospitalist Consult to assist with medical management. Please call me  below with any questions on this patient.       Review of Systems Notable for: History of atopic dermatitis-significant rash/hypersensitivity reaction to previous hospital linens/bedding/detergent-we will use paper sheets in [facility], or, and PACU, history of DVT-reportedly provoked following left hip replacement-not on chronic anticoagulation, history of CVA, hyperlipidemia, hypertension, history of prostate cancer-s/p prostatectomy in 2009.    Past Medical History:   Past Medical History:   Diagnosis Date    Cerebral artery occlusion with cerebral infarction (H)     Cramp of limb     DJD (degenerative joint disease)     R knee    Dyslipidemia     Glaucoma     H/O prostatectomy     2009- s/p radiation therapy n 2011    High blood pressure     History of prostate cancer     Impotence of organic origin     Thrombosis      Past Surgical History:   Procedure Laterality Date    CATARACT IOL, RT/LT Right 2014    ORTHOPEDIC SURGERY Left 2002    hip replacement    PHACOEMULSIFICATION CLEAR CORNEA W/ STANDARD IOL IMPLANT, ENDOSCOPIC CYCLOPHOTOCOAGULATION, COMBINED Right 9/25/2014    Procedure: COMBINED PHACOEMULSIFICATION CLEAR CORNEA WITH STANDARD INTRAOCULAR LENS IMPLANT, ENDOSCOPIC CYCLOPHOTOCOAGULATION;  Surgeon: Romain Duran MD;  Location:  [facility]    PROSTATECTOMY SUPRAPUBIC  7/2009       Current Medications:  Patient's Medications   New Prescriptions    No medications on file   Previous Medications    ASCORBIC ACID (VITAMIN C) 500 MG CPCR    Take 1,000 mg by mouth daily    ASPIRIN (ASA) 325 MG EC TABLET    Take 325 mg by mouth daily    ATORVASTATIN (LIPITOR) 40 MG TABLET        BRIMONIDINE (ALPHAGAN) 0.2 % OPHTHALMIC SOLUTION    1 drop 2 times daily    CIALIS 20 MG TABLET    Four tiems per elias , once weekly    DORZOLAMIDE-TIMOLOL (COSOPT) 2-0.5 % OPHTHALMIC SOLUTION        DOXYCYCLINE (VIBRAMYCIN) 100 MG CAPSULE    2 times daily    LATANOPROST (XALATAN) 0.005 % OPHTHALMIC SOLUTION         LOSARTAN-HYDROCHLOROTHIAZIDE (HYZAAR) 50-12.5 MG PER TABLET        METRONIDAZOLE (METROGEL) 0.75 % GEL        NETARSUDIL (RHOPRESSA) 0.02 % OPHTHALMIC SOLUTION    1 drop At Bedtime    VITAMIN D (ERGOCALCIFEROL) 90953 UNIT CAPSULE    twice a week   Modified Medications    No medications on file   Discontinued Medications    ASPIRIN PO    Take 81 mg by mouth daily       ALLERGIES:  Allergies   Allergen Reactions    Soap & Cleansers Rash     Severe rash from detergent on hospital linens    Amoxicillin-Pot Clavulanate Rash    Latex Rash    Percocet [Oxycodone-Acetaminophen] Rash       Social History  Social History     Tobacco Use    Smoking status: Former     Types: Cigarettes    Smokeless tobacco: Never    Tobacco comments:     quit 1985   Substance Use Topics    Alcohol use: Not Currently     Comment: 0 drinks or beer per week    Drug use: No       Any Abnormal Recent Diagnostics? No    Discharge Planning:   Discharge plan according to [facility]:      Home morning of POD 1 with assist of Spouse     07/07/23 2599   Discharge Planning   Patient/Family Anticipates Transition to home   Concerns to be Addressed all concerns addressed in this encounter   Living Arrangements   People in Home spouse   Type of Residence Private Residence   Is your private residence a single family home or apartment? Single family home   Stair Railings, Within Home, Primary railings safe and in good condition   Once home, are you able to live on one level? Yes   Bathroom Shower/Tub Walk-in shower   Equipment Currently Used at Home none;walker, standard   Support System   Support Systems Spouse/Significant Other   Do you have someone available to stay with you one or two nights once you are home? Yes       VERONICA Flores, CNP   Advanced Practice Nurse Navigator- Orthopedics  [facility]   Phone: [phone]

## 2023-08-15 ENCOUNTER — APPOINTMENT (OUTPATIENT)
Dept: PHYSICAL THERAPY | Facility: CLINIC | Age: 78
End: 2023-08-15
Attending: ORTHOPAEDIC SURGERY
Payer: COMMERCIAL

## 2023-08-15 ENCOUNTER — ANESTHESIA (OUTPATIENT)
Dept: SURGERY | Facility: CLINIC | Age: 78
End: 2023-08-15
Payer: COMMERCIAL

## 2023-08-15 ENCOUNTER — HOSPITAL ENCOUNTER (OUTPATIENT)
Facility: CLINIC | Age: 78
Discharge: HOME | End: 2023-08-16
Attending: ORTHOPAEDIC SURGERY | Admitting: ORTHOPAEDIC SURGERY
Payer: COMMERCIAL

## 2023-08-15 DIAGNOSIS — M17.11 PRIMARY OSTEOARTHRITIS OF RIGHT KNEE: Primary | ICD-10-CM

## 2023-08-15 PROBLEM — M17.9 KNEE OSTEOARTHRITIS: Status: ACTIVE | Noted: 2023-08-15

## 2023-08-15 LAB
CREAT SERPL-MCNC: 0.89 MG/DL (ref 0.7–1.3)
GFR SERPL CREATININE-BSD FRML MDRD: 87 ML/MIN/1.73M2

## 2023-08-15 PROCEDURE — 272N000001 HC OR GENERAL SUPPLY STERILE: Performed by: ORTHOPAEDIC SURGERY

## 2023-08-15 PROCEDURE — 370N000017 HC ANESTHESIA TECHNICAL FEE, PER MIN: Performed by: ORTHOPAEDIC SURGERY

## 2023-08-15 PROCEDURE — 250N000011 HC RX IP 250 OP 636: Mod: JZ | Performed by: NURSE ANESTHETIST, CERTIFIED REGISTERED

## 2023-08-15 PROCEDURE — 99204 OFFICE O/P NEW MOD 45 MIN: CPT | Performed by: HOSPITALIST

## 2023-08-15 PROCEDURE — 250N000011 HC RX IP 250 OP 636: Performed by: PHYSICIAN ASSISTANT

## 2023-08-15 PROCEDURE — 710N000010 HC RECOVERY PHASE 1, LEVEL 2, PER MIN: Performed by: ORTHOPAEDIC SURGERY

## 2023-08-15 PROCEDURE — 258N000003 HC RX IP 258 OP 636: Performed by: ANESTHESIOLOGY

## 2023-08-15 PROCEDURE — 250N000009 HC RX 250: Performed by: NURSE ANESTHETIST, CERTIFIED REGISTERED

## 2023-08-15 PROCEDURE — 82565 ASSAY OF CREATININE: CPT | Performed by: ORTHOPAEDIC SURGERY

## 2023-08-15 PROCEDURE — 258N000003 HC RX IP 258 OP 636: Performed by: NURSE ANESTHETIST, CERTIFIED REGISTERED

## 2023-08-15 PROCEDURE — 250N000013 HC RX MED GY IP 250 OP 250 PS 637: Performed by: ORTHOPAEDIC SURGERY

## 2023-08-15 PROCEDURE — C1713 ANCHOR/SCREW BN/BN,TIS/BN: HCPCS | Performed by: ORTHOPAEDIC SURGERY

## 2023-08-15 PROCEDURE — 999N000141 HC STATISTIC PRE-PROCEDURE NURSING ASSESSMENT: Performed by: ORTHOPAEDIC SURGERY

## 2023-08-15 PROCEDURE — 250N000011 HC RX IP 250 OP 636: Performed by: ANESTHESIOLOGY

## 2023-08-15 PROCEDURE — 36415 COLL VENOUS BLD VENIPUNCTURE: CPT | Performed by: ORTHOPAEDIC SURGERY

## 2023-08-15 PROCEDURE — 250N000011 HC RX IP 250 OP 636: Mod: JZ | Performed by: ANESTHESIOLOGY

## 2023-08-15 PROCEDURE — 97116 GAIT TRAINING THERAPY: CPT | Mod: GP

## 2023-08-15 PROCEDURE — 97110 THERAPEUTIC EXERCISES: CPT | Mod: GP

## 2023-08-15 PROCEDURE — 250N000009 HC RX 250: Performed by: PHYSICIAN ASSISTANT

## 2023-08-15 PROCEDURE — 258N000003 HC RX IP 258 OP 636: Performed by: ORTHOPAEDIC SURGERY

## 2023-08-15 PROCEDURE — 97161 PT EVAL LOW COMPLEX 20 MIN: CPT | Mod: GP

## 2023-08-15 PROCEDURE — 250N000013 HC RX MED GY IP 250 OP 250 PS 637: Performed by: PHYSICIAN ASSISTANT

## 2023-08-15 PROCEDURE — C1776 JOINT DEVICE (IMPLANTABLE): HCPCS | Performed by: ORTHOPAEDIC SURGERY

## 2023-08-15 PROCEDURE — 250N000011 HC RX IP 250 OP 636: Mod: JZ | Performed by: ORTHOPAEDIC SURGERY

## 2023-08-15 PROCEDURE — 360N000077 HC SURGERY LEVEL 4, PER MIN: Performed by: ORTHOPAEDIC SURGERY

## 2023-08-15 PROCEDURE — 250N000009 HC RX 250: Performed by: ANESTHESIOLOGY

## 2023-08-15 PROCEDURE — 258N000001 HC RX 258: Performed by: ORTHOPAEDIC SURGERY

## 2023-08-15 DEVICE — IMPLANTABLE DEVICE
Type: IMPLANTABLE DEVICE | Site: KNEE | Status: FUNCTIONAL
Brand: PERSONA®

## 2023-08-15 DEVICE — SIMPLEX® HV IS A FAST-SETTING ACRYLIC RESIN FOR USE IN BONE SURGERY. MIXING THE TWO SEPARATE STERILE COMPONENTS PRODUCES A DUCTILE BONE CEMENT WHICH, AFTER HARDENING, FIXES THE IMPLANT AND TRANSFERS STRESSES PRODUCED DURING MOVEMENT EVENLY TO THE BONE. SIMPLEX® HV CEMENT POWDER ALSO CONTAINS INSOLUBLE ZIRCONIUM DIOXIDE AS AN X-RAY CONTRAST MEDIUM. SIMPLEX® HV DOES NOT EMIT A SIGNAL AND DOES NOT POSE A SAFETY RISK IN A MAGNETIC RESONANCE ENVIRONMENT.
Type: IMPLANTABLE DEVICE | Site: KNEE | Status: FUNCTIONAL
Brand: SIMPLEX HV

## 2023-08-15 DEVICE — IMPLANTABLE DEVICE
Type: IMPLANTABLE DEVICE | Site: KNEE | Status: FUNCTIONAL
Brand: PERSONA™

## 2023-08-15 DEVICE — IMPLANTABLE DEVICE
Type: IMPLANTABLE DEVICE | Site: KNEE | Status: FUNCTIONAL
Brand: PERSONA® NATURAL TIBIA®

## 2023-08-15 RX ORDER — NALOXONE HYDROCHLORIDE 0.4 MG/ML
0.4 INJECTION, SOLUTION INTRAMUSCULAR; INTRAVENOUS; SUBCUTANEOUS
Status: DISCONTINUED | OUTPATIENT
Start: 2023-08-15 | End: 2023-08-16 | Stop reason: HOSPADM

## 2023-08-15 RX ORDER — ONDANSETRON 2 MG/ML
4 INJECTION INTRAMUSCULAR; INTRAVENOUS EVERY 30 MIN PRN
Status: DISCONTINUED | OUTPATIENT
Start: 2023-08-15 | End: 2023-08-15 | Stop reason: HOSPADM

## 2023-08-15 RX ORDER — LIDOCAINE 40 MG/G
CREAM TOPICAL
Status: DISCONTINUED | OUTPATIENT
Start: 2023-08-15 | End: 2023-08-15 | Stop reason: HOSPADM

## 2023-08-15 RX ORDER — NALOXONE HYDROCHLORIDE 0.4 MG/ML
0.2 INJECTION, SOLUTION INTRAMUSCULAR; INTRAVENOUS; SUBCUTANEOUS
Status: DISCONTINUED | OUTPATIENT
Start: 2023-08-15 | End: 2023-08-16 | Stop reason: HOSPADM

## 2023-08-15 RX ORDER — DEXAMETHASONE SODIUM PHOSPHATE 10 MG/ML
INJECTION, SOLUTION INTRAMUSCULAR; INTRAVENOUS PRN
Status: DISCONTINUED | OUTPATIENT
Start: 2023-08-15 | End: 2023-08-15

## 2023-08-15 RX ORDER — GLYCOPYRROLATE 0.2 MG/ML
INJECTION, SOLUTION INTRAMUSCULAR; INTRAVENOUS PRN
Status: DISCONTINUED | OUTPATIENT
Start: 2023-08-15 | End: 2023-08-15

## 2023-08-15 RX ORDER — AMOXICILLIN 250 MG
1 CAPSULE ORAL 2 TIMES DAILY
Status: DISCONTINUED | OUTPATIENT
Start: 2023-08-15 | End: 2023-08-16 | Stop reason: HOSPADM

## 2023-08-15 RX ORDER — DORZOLAMIDE HYDROCHLORIDE AND TIMOLOL MALEATE 20; 5 MG/ML; MG/ML
1 SOLUTION/ DROPS OPHTHALMIC 2 TIMES DAILY
Status: DISCONTINUED | OUTPATIENT
Start: 2023-08-15 | End: 2023-08-16 | Stop reason: HOSPADM

## 2023-08-15 RX ORDER — FENTANYL CITRATE 50 UG/ML
INJECTION, SOLUTION INTRAMUSCULAR; INTRAVENOUS PRN
Status: DISCONTINUED | OUTPATIENT
Start: 2023-08-15 | End: 2023-08-15

## 2023-08-15 RX ORDER — HYDROMORPHONE HYDROCHLORIDE 2 MG/1
2 TABLET ORAL EVERY 4 HOURS PRN
Status: DISCONTINUED | OUTPATIENT
Start: 2023-08-15 | End: 2023-08-16 | Stop reason: HOSPADM

## 2023-08-15 RX ORDER — TRANEXAMIC ACID 650 MG/1
1950 TABLET ORAL ONCE
Status: COMPLETED | OUTPATIENT
Start: 2023-08-15 | End: 2023-08-15

## 2023-08-15 RX ORDER — HYDROMORPHONE HCL IN WATER/PF 6 MG/30 ML
0.4 PATIENT CONTROLLED ANALGESIA SYRINGE INTRAVENOUS
Status: DISCONTINUED | OUTPATIENT
Start: 2023-08-15 | End: 2023-08-16 | Stop reason: HOSPADM

## 2023-08-15 RX ORDER — ONDANSETRON 4 MG/1
4 TABLET, ORALLY DISINTEGRATING ORAL EVERY 30 MIN PRN
Status: DISCONTINUED | OUTPATIENT
Start: 2023-08-15 | End: 2023-08-15 | Stop reason: HOSPADM

## 2023-08-15 RX ORDER — POLYETHYLENE GLYCOL 3350 17 G/17G
17 POWDER, FOR SOLUTION ORAL DAILY
Status: DISCONTINUED | OUTPATIENT
Start: 2023-08-16 | End: 2023-08-16 | Stop reason: HOSPADM

## 2023-08-15 RX ORDER — BISACODYL 10 MG
10 SUPPOSITORY, RECTAL RECTAL DAILY PRN
Status: DISCONTINUED | OUTPATIENT
Start: 2023-08-15 | End: 2023-08-16 | Stop reason: HOSPADM

## 2023-08-15 RX ORDER — MEPERIDINE HYDROCHLORIDE 25 MG/ML
12.5 INJECTION INTRAMUSCULAR; INTRAVENOUS; SUBCUTANEOUS EVERY 5 MIN PRN
Status: DISCONTINUED | OUTPATIENT
Start: 2023-08-15 | End: 2023-08-15 | Stop reason: HOSPADM

## 2023-08-15 RX ORDER — BUPIVACAINE HYDROCHLORIDE 7.5 MG/ML
INJECTION, SOLUTION INTRASPINAL
Status: COMPLETED | OUTPATIENT
Start: 2023-08-15 | End: 2023-08-15

## 2023-08-15 RX ORDER — LIDOCAINE 40 MG/G
CREAM TOPICAL
Status: DISCONTINUED | OUTPATIENT
Start: 2023-08-15 | End: 2023-08-16 | Stop reason: HOSPADM

## 2023-08-15 RX ORDER — ONDANSETRON 2 MG/ML
INJECTION INTRAMUSCULAR; INTRAVENOUS PRN
Status: DISCONTINUED | OUTPATIENT
Start: 2023-08-15 | End: 2023-08-15

## 2023-08-15 RX ORDER — CEFAZOLIN SODIUM/WATER 2 G/20 ML
2 SYRINGE (ML) INTRAVENOUS
Status: COMPLETED | OUTPATIENT
Start: 2023-08-15 | End: 2023-08-15

## 2023-08-15 RX ORDER — CEFAZOLIN SODIUM 2 G/100ML
2 INJECTION, SOLUTION INTRAVENOUS EVERY 8 HOURS
Status: COMPLETED | OUTPATIENT
Start: 2023-08-15 | End: 2023-08-16

## 2023-08-15 RX ORDER — HYDROMORPHONE HYDROCHLORIDE 2 MG/1
2-4 TABLET ORAL EVERY 4 HOURS PRN
Qty: 30 TABLET | Refills: 0 | Status: ON HOLD | OUTPATIENT
Start: 2023-08-15 | End: 2023-11-29

## 2023-08-15 RX ORDER — HYDROMORPHONE HCL IN WATER/PF 6 MG/30 ML
0.4 PATIENT CONTROLLED ANALGESIA SYRINGE INTRAVENOUS EVERY 5 MIN PRN
Status: DISCONTINUED | OUTPATIENT
Start: 2023-08-15 | End: 2023-08-15 | Stop reason: HOSPADM

## 2023-08-15 RX ORDER — SODIUM CHLORIDE, SODIUM LACTATE, POTASSIUM CHLORIDE, CALCIUM CHLORIDE 600; 310; 30; 20 MG/100ML; MG/100ML; MG/100ML; MG/100ML
INJECTION, SOLUTION INTRAVENOUS CONTINUOUS
Status: DISCONTINUED | OUTPATIENT
Start: 2023-08-15 | End: 2023-08-15 | Stop reason: HOSPADM

## 2023-08-15 RX ORDER — BRIMONIDINE TARTRATE 2 MG/ML
1 SOLUTION/ DROPS OPHTHALMIC 2 TIMES DAILY
Status: DISCONTINUED | OUTPATIENT
Start: 2023-08-15 | End: 2023-08-16 | Stop reason: HOSPADM

## 2023-08-15 RX ORDER — FENTANYL CITRATE 50 UG/ML
50 INJECTION, SOLUTION INTRAMUSCULAR; INTRAVENOUS EVERY 5 MIN PRN
Status: DISCONTINUED | OUTPATIENT
Start: 2023-08-15 | End: 2023-08-15 | Stop reason: HOSPADM

## 2023-08-15 RX ORDER — ONDANSETRON 4 MG/1
4 TABLET, ORALLY DISINTEGRATING ORAL EVERY 6 HOURS PRN
Status: DISCONTINUED | OUTPATIENT
Start: 2023-08-15 | End: 2023-08-16 | Stop reason: HOSPADM

## 2023-08-15 RX ORDER — ACETAMINOPHEN 500 MG
1 TABLET ORAL DAILY
COMMUNITY

## 2023-08-15 RX ORDER — CEFAZOLIN SODIUM/WATER 2 G/20 ML
2 SYRINGE (ML) INTRAVENOUS SEE ADMIN INSTRUCTIONS
Status: DISCONTINUED | OUTPATIENT
Start: 2023-08-15 | End: 2023-08-15 | Stop reason: HOSPADM

## 2023-08-15 RX ORDER — PROCHLORPERAZINE MALEATE 5 MG
5 TABLET ORAL EVERY 6 HOURS PRN
Status: DISCONTINUED | OUTPATIENT
Start: 2023-08-15 | End: 2023-08-16 | Stop reason: HOSPADM

## 2023-08-15 RX ORDER — BRIMONIDINE TARTRATE 2 MG/ML
1 SOLUTION/ DROPS OPHTHALMIC 2 TIMES DAILY
Status: DISCONTINUED | OUTPATIENT
Start: 2023-08-15 | End: 2023-08-15

## 2023-08-15 RX ORDER — SODIUM CHLORIDE, SODIUM LACTATE, POTASSIUM CHLORIDE, CALCIUM CHLORIDE 600; 310; 30; 20 MG/100ML; MG/100ML; MG/100ML; MG/100ML
INJECTION, SOLUTION INTRAVENOUS CONTINUOUS
Status: DISCONTINUED | OUTPATIENT
Start: 2023-08-15 | End: 2023-08-16 | Stop reason: HOSPADM

## 2023-08-15 RX ORDER — MULTIPLE VITAMINS W/ MINERALS TAB 9MG-400MCG
1 TAB ORAL DAILY
COMMUNITY

## 2023-08-15 RX ORDER — BUPIVACAINE HYDROCHLORIDE AND EPINEPHRINE 5; 5 MG/ML; UG/ML
INJECTION, SOLUTION PERINEURAL
Status: COMPLETED | OUTPATIENT
Start: 2023-08-15 | End: 2023-08-15

## 2023-08-15 RX ORDER — AMOXICILLIN 250 MG
1-2 CAPSULE ORAL 2 TIMES DAILY
Qty: 30 TABLET | Refills: 0 | Status: ON HOLD | OUTPATIENT
Start: 2023-08-15 | End: 2023-11-29

## 2023-08-15 RX ORDER — ONDANSETRON 2 MG/ML
4 INJECTION INTRAMUSCULAR; INTRAVENOUS EVERY 6 HOURS PRN
Status: DISCONTINUED | OUTPATIENT
Start: 2023-08-15 | End: 2023-08-16 | Stop reason: HOSPADM

## 2023-08-15 RX ORDER — HYDROCORTISONE ACETATE 0.5 %
1 CREAM (GRAM) TOPICAL DAILY
COMMUNITY

## 2023-08-15 RX ORDER — LATANOPROST 50 UG/ML
1 SOLUTION/ DROPS OPHTHALMIC AT BEDTIME
Status: DISCONTINUED | OUTPATIENT
Start: 2023-08-15 | End: 2023-08-16 | Stop reason: HOSPADM

## 2023-08-15 RX ORDER — HYDROMORPHONE HCL IN WATER/PF 6 MG/30 ML
0.2 PATIENT CONTROLLED ANALGESIA SYRINGE INTRAVENOUS
Status: DISCONTINUED | OUTPATIENT
Start: 2023-08-15 | End: 2023-08-16 | Stop reason: HOSPADM

## 2023-08-15 RX ORDER — AMOXICILLIN 500 MG
1200 CAPSULE ORAL DAILY
COMMUNITY

## 2023-08-15 RX ORDER — HYDROMORPHONE HCL IN WATER/PF 6 MG/30 ML
0.2 PATIENT CONTROLLED ANALGESIA SYRINGE INTRAVENOUS EVERY 5 MIN PRN
Status: DISCONTINUED | OUTPATIENT
Start: 2023-08-15 | End: 2023-08-15 | Stop reason: HOSPADM

## 2023-08-15 RX ORDER — FENTANYL CITRATE 50 UG/ML
25 INJECTION, SOLUTION INTRAMUSCULAR; INTRAVENOUS EVERY 5 MIN PRN
Status: DISCONTINUED | OUTPATIENT
Start: 2023-08-15 | End: 2023-08-15 | Stop reason: HOSPADM

## 2023-08-15 RX ORDER — PROPOFOL 10 MG/ML
INJECTION, EMULSION INTRAVENOUS CONTINUOUS PRN
Status: DISCONTINUED | OUTPATIENT
Start: 2023-08-15 | End: 2023-08-15

## 2023-08-15 RX ORDER — FENTANYL CITRATE 50 UG/ML
25-100 INJECTION, SOLUTION INTRAMUSCULAR; INTRAVENOUS
Status: DISCONTINUED | OUTPATIENT
Start: 2023-08-15 | End: 2023-08-15 | Stop reason: HOSPADM

## 2023-08-15 RX ORDER — HALOPERIDOL 5 MG/ML
1 INJECTION INTRAMUSCULAR
Status: DISCONTINUED | OUTPATIENT
Start: 2023-08-15 | End: 2023-08-15 | Stop reason: HOSPADM

## 2023-08-15 RX ORDER — HYDROMORPHONE HYDROCHLORIDE 4 MG/1
4 TABLET ORAL EVERY 4 HOURS PRN
Status: DISCONTINUED | OUTPATIENT
Start: 2023-08-15 | End: 2023-08-16 | Stop reason: HOSPADM

## 2023-08-15 RX ORDER — DORZOLAMIDE HYDROCHLORIDE AND TIMOLOL MALEATE 20; 5 MG/ML; MG/ML
1 SOLUTION/ DROPS OPHTHALMIC 2 TIMES DAILY
Status: DISCONTINUED | OUTPATIENT
Start: 2023-08-15 | End: 2023-08-15

## 2023-08-15 RX ADMIN — PHENYLEPHRINE HYDROCHLORIDE 100 MCG: 10 INJECTION INTRAVENOUS at 09:37

## 2023-08-15 RX ADMIN — PHENYLEPHRINE HYDROCHLORIDE 0.4 MCG/KG/MIN: 10 INJECTION INTRAVENOUS at 07:50

## 2023-08-15 RX ADMIN — SENNOSIDES AND DOCUSATE SODIUM 1 TABLET: 50; 8.6 TABLET ORAL at 20:15

## 2023-08-15 RX ADMIN — ONDANSETRON 4 MG: 2 INJECTION INTRAMUSCULAR; INTRAVENOUS at 10:31

## 2023-08-15 RX ADMIN — CEFAZOLIN SODIUM 2 G: 2 INJECTION, SOLUTION INTRAVENOUS at 17:25

## 2023-08-15 RX ADMIN — BUPIVACAINE HYDROCHLORIDE AND EPINEPHRINE BITARTRATE 15 ML: 5; .005 INJECTION, SOLUTION PERINEURAL at 08:13

## 2023-08-15 RX ADMIN — Medication 2 G: at 09:07

## 2023-08-15 RX ADMIN — SODIUM CHLORIDE, POTASSIUM CHLORIDE, SODIUM LACTATE AND CALCIUM CHLORIDE: 600; 310; 30; 20 INJECTION, SOLUTION INTRAVENOUS at 07:47

## 2023-08-15 RX ADMIN — GLYCOPYRROLATE 0.2 MG: 0.2 INJECTION INTRAMUSCULAR; INTRAVENOUS at 09:50

## 2023-08-15 RX ADMIN — SODIUM CHLORIDE, POTASSIUM CHLORIDE, SODIUM LACTATE AND CALCIUM CHLORIDE: 600; 310; 30; 20 INJECTION, SOLUTION INTRAVENOUS at 14:24

## 2023-08-15 RX ADMIN — FENTANYL CITRATE 50 MCG: 50 INJECTION, SOLUTION INTRAMUSCULAR; INTRAVENOUS at 08:13

## 2023-08-15 RX ADMIN — FENTANYL CITRATE 50 MCG: 50 INJECTION, SOLUTION INTRAMUSCULAR; INTRAVENOUS at 09:27

## 2023-08-15 RX ADMIN — PROPOFOL 150 MCG/KG/MIN: 10 INJECTION, EMULSION INTRAVENOUS at 09:27

## 2023-08-15 RX ADMIN — DEXAMETHASONE SODIUM PHOSPHATE 10 MG: 10 INJECTION, SOLUTION INTRAMUSCULAR; INTRAVENOUS at 09:52

## 2023-08-15 RX ADMIN — PHENYLEPHRINE HYDROCHLORIDE 100 MCG: 10 INJECTION INTRAVENOUS at 09:33

## 2023-08-15 RX ADMIN — MIDAZOLAM HYDROCHLORIDE 1 MG: 1 INJECTION, SOLUTION INTRAMUSCULAR; INTRAVENOUS at 08:13

## 2023-08-15 RX ADMIN — TRANEXAMIC ACID 1950 MG: 650 TABLET ORAL at 07:27

## 2023-08-15 RX ADMIN — SODIUM CHLORIDE, POTASSIUM CHLORIDE, SODIUM LACTATE AND CALCIUM CHLORIDE: 600; 310; 30; 20 INJECTION, SOLUTION INTRAVENOUS at 20:15

## 2023-08-15 RX ADMIN — FENTANYL CITRATE 50 MCG: 50 INJECTION, SOLUTION INTRAMUSCULAR; INTRAVENOUS at 09:13

## 2023-08-15 RX ADMIN — PHENYLEPHRINE HYDROCHLORIDE 100 MCG: 10 INJECTION INTRAVENOUS at 09:51

## 2023-08-15 RX ADMIN — SODIUM CHLORIDE, POTASSIUM CHLORIDE, SODIUM LACTATE AND CALCIUM CHLORIDE: 600; 310; 30; 20 INJECTION, SOLUTION INTRAVENOUS at 09:49

## 2023-08-15 RX ADMIN — BUPIVACAINE HYDROCHLORIDE IN DEXTROSE 1.6 ML: 7.5 INJECTION, SOLUTION SUBARACHNOID at 09:10

## 2023-08-15 ASSESSMENT — ACTIVITIES OF DAILY LIVING (ADL)
ADLS_ACUITY_SCORE: 29
ADLS_ACUITY_SCORE: 31
ADLS_ACUITY_SCORE: 31
ADLS_ACUITY_SCORE: 37
ADLS_ACUITY_SCORE: 37
ADLS_ACUITY_SCORE: 35
ADLS_ACUITY_SCORE: 37
ADLS_ACUITY_SCORE: 37
ADLS_ACUITY_SCORE: 36

## 2023-08-15 ASSESSMENT — ENCOUNTER SYMPTOMS: DYSRHYTHMIAS: 1

## 2023-08-15 NOTE — ANESTHESIA POSTPROCEDURE EVALUATION
Patient: Jhonatan GUTIÉRREZ Hobday    Procedure: Procedure(s):  RIGHT TOTAL KNEE ARTHROPLASTY       Anesthesia Type:  Spinal    Note:  Disposition: Inpatient   Postop Pain Control: Uneventful            Sign Out: Well controlled pain   PONV: No   Neuro/Psych: Uneventful            Sign Out: Acceptable/Baseline neuro status   Airway/Respiratory: Uneventful            Sign Out: Acceptable/Baseline resp. status   CV/Hemodynamics: Uneventful            Sign Out: Acceptable CV status; No obvious hypovolemia; No obvious fluid overload   Other NRE: NONE   DID A NON-ROUTINE EVENT OCCUR?            Last vitals:  Vitals Value Taken Time   /61 08/15/23 1130   Temp 36.4  C (97.6  F) 08/15/23 1120   Pulse 67 08/15/23 1135   Resp 19 08/15/23 1121   SpO2 97 % 08/15/23 1135   Vitals shown include unvalidated device data.    Electronically Signed By: Celestina Darilng MD  August 15, 2023  12:13 PM

## 2023-08-15 NOTE — ANESTHESIA PREPROCEDURE EVALUATION
Anesthesia Pre-Procedure Evaluation    Patient: Jhonatan Hewitt   MRN: [id number] : 1945        Procedure : Procedure(s):  RIGHT TOTAL KNEE ARTHROPLASTY          Past Medical History:   Diagnosis Date    Cerebral artery occlusion with cerebral infarction (H)     Cramp of limb     DJD (degenerative joint disease)     R knee    Dyslipidemia     Glaucoma     H/O prostatectomy     2009- s/p radiation therapy n     High blood pressure     History of prostate cancer     Impotence of organic origin     Thrombosis       Past Surgical History:   Procedure Laterality Date    CATARACT IOL, RT/LT Right     ORTHOPEDIC SURGERY Left     hip replacement    PHACOEMULSIFICATION CLEAR CORNEA W/ STANDARD IOL IMPLANT, ENDOSCOPIC CYCLOPHOTOCOAGULATION, COMBINED Right 2014    Procedure: COMBINED PHACOEMULSIFICATION CLEAR CORNEA WITH STANDARD INTRAOCULAR LENS IMPLANT, ENDOSCOPIC CYCLOPHOTOCOAGULATION;  Surgeon: Romain Duran MD;  Location:  [facility]    PROSTATECTOMY SUPRAPUBIC  2009      Allergies   Allergen Reactions    Soap & Cleansers Rash     Severe rash from detergent on hospital linens    Amoxicillin-Pot Clavulanate Rash    Latex Rash    Percocet [Oxycodone-Acetaminophen] Rash      Social History     Tobacco Use    Smoking status: Former     Types: Cigarettes    Smokeless tobacco: Never    Tobacco comments:     quit    Substance Use Topics    Alcohol use: Not Currently     Comment: 0 drinks or beer per week      Wt Readings from Last 1 Encounters:   23 112 kg (247 lb)        Anesthesia Evaluation   Pt has had prior anesthetic.     No history of anesthetic complications       ROS/MED HX  ENT/Pulmonary:  - neg pulmonary ROS     Neurologic:     (+)          CVA,                      Cardiovascular:     (+) Dyslipidemia hypertension- -   -  - -                        dysrhythmias, 1st Deg Heart Block,             METS/Exercise Tolerance:     Hematologic:     (+) History of blood clots,    pt is  not anticoagulated,           Musculoskeletal:  - neg musculoskeletal ROS     GI/Hepatic:    (-) GERD   Renal/Genitourinary:  - neg Renal ROS     Endo:     (+)               Obesity (BMI 32),    (-) Type II DM   Psychiatric/Substance Use:       Infectious Disease:  - neg infectious disease ROS     Malignancy:   (+) Malignancy, History of Prostate.    Other: Comment: Severe contact dermatitis to hospital sheets/linens - plan in place for paper sheets/bedding, and home bedding post op              OUTSIDE LABS:  CBC: No results found for: WBC, HGB, HCT, PLT  BMP: No results found for: NA, POTASSIUM, CHLORIDE, CO2, BUN, CR, GLC  COAGS: No results found for: PTT, INR, FIBR  POC: No results found for: BGM, HCG, HCGS  HEPATIC: No results found for: ALBUMIN, PROTTOTAL, ALT, AST, GGT, ALKPHOS, BILITOTAL, BILIDIRECT, GENIE  OTHER: No results found for: PH, LACT, A1C, KWABENA, PHOS, MAG, LIPASE, AMYLASE, TSH, T4, T3, CRP, SED    Anesthesia Plan    ASA Status:  2    NPO Status:  NPO Appropriate    Anesthesia Type: Spinal.   Induction: Intravenous, Propofol.   Maintenance: TIVA.        Consents    Anesthesia Plan(s) and associated risks, benefits, and realistic alternatives discussed. Questions answered and patient/representative(s) expressed understanding.     - Discussed:     - Discussed with:  Patient, Spouse            Postoperative Care    Pain management: Peripheral nerve block (Single Shot), Multi-modal analgesia.   PONV prophylaxis: Ondansetron (or other 5HT-3), Background Propofol Infusion, Dexamethasone or Solumedrol     Comments:    Other Comments: SAB  Propofol gtt, phenylephrine gtt  Decadron 10, Zofran 4 for antiemesis  Preop AC PNB for post op pain per surgeon request            Celestina Darling MD

## 2023-08-15 NOTE — PROGRESS NOTES
08/15/23 1530   Appointment Info   Signing Clinician's Name / Credentials (PT) Monica Jones PT   Quick Adds   Quick Adds Certification   Living Environment   People in Home spouse   Current Living Arrangements house   Home Accessibility stairs to enter home;stairs within home   Number of Stairs, Main Entrance 2   Stair Railings, Main Entrance railings on both sides of stairs   Number of Stairs, Within Home, Primary greater than 10 stairs   Stair Railings, Within Home, Primary railings safe and in good condition   Living Environment Comments Pt reports they have chair lift to 2nd floor where bedroom is.   Self-Care   Usual Activity Tolerance good   Current Activity Tolerance moderate   Equipment Currently Used at Home walker, standard   Activity/Exercise/Self-Care Comment Reports independent with all mobility using pick-up walker at baseline.   General Information   Onset of Illness/Injury or Date of Surgery 08/15/23   Referring Physician Dr Toy Jerry   Patient/Family Therapy Goals Statement (PT) No more pain   Pertinent History of Current Problem (include personal factors and/or comorbidities that impact the POC) Admit for R TKA   Existing Precautions/Restrictions weight bearing   Weight-Bearing Status - RLE weight-bearing as tolerated   Pain Assessment   Patient Currently in Pain Yes, see Vital Sign flowsheet   Bed Mobility   Assistive Device (Bed Mobility) bed rails   Comment, (Bed Mobility) Supine>sit CGA   Transfers   Impairments Contributing to Impaired Transfers decreased ROM;decreased strength   Comment, (Transfers) Sit<>stand CGA   Gait/Stairs (Locomotion)   Austin Level (Gait) contact guard   Assistive Device (Gait) walker, front-wheeled   Distance in Feet 10'x1   Distance in Feet (Gait) 75'x1   Pattern (Gait) step-to   Deviations/Abnormal Patterns (Gait) antalgic;gait speed decreased;stride length decreased;weight shifting decreased   Clinical Impression   Criteria for Skilled  Therapeutic Intervention Yes, treatment indicated   PT Diagnosis (PT) Impaired functional mobility   Influenced by the following impairments weakness, pain   Functional limitations due to impairments transfers, gait, stairs   Clinical Presentation (PT Evaluation Complexity) Stable/Uncomplicated   Clinical Presentation Rationale Presents as medically diagnosed.   Clinical Decision Making (Complexity) low complexity   Planned Therapy Interventions (PT) gait training;home exercise program;patient/family education;ROM (range of motion);stair training;transfer training   Anticipated Equipment Needs at Discharge (PT) walker, rolling   Risk & Benefits of therapy have been explained evaluation/treatment results reviewed;care plan/treatment goals reviewed;risks/benefits reviewed;participants voiced agreement with care plan;participants included;patient;spouse/significant other   PT Total Evaluation Time   PT Eval, Low Complexity Minutes (74026) 15   Plan of Care Review   Plan of Care Reviewed With patient;spouse   Therapy Certification   Start of care date 08/15/23   Certification date from 08/15/23   Certification date to 09/12/23   Medical Diagnosis R TKA   Physical Therapy Goals   PT Frequency Daily   PT Predicted Duration/Target Date for Goal Attainment 08/16/23   PT Goals Transfers;Gait;Stairs;PT Goal 1   PT: Transfers Supervision/stand-by assist;Sit to/from stand;Assistive device   PT: Gait Supervision/stand-by assist;Rolling walker;Assistive device;100 feet   PT: Stairs Supervision/stand-by assist;3 stairs;Rail on both sides   PT: Goal 1 Complete HEP following written handout with supervision from family   Interventions   Interventions Quick Adds Gait Training;Therapeutic Activity;Therapeutic Procedure   Therapeutic Procedure/Exercise   Ther. Procedure: strength, endurance, ROM, flexibillity Minutes (60529) 15   Symptoms Noted During/After Treatment fatigue;increased pain   Treatment Detail/Skilled Intervention R TKA  recliner ther ex x 10 CGA/min A with cues for technique. Written handout issued.   Therapeutic Activity   Symptoms Noted During/After Treatment Fatigue   Treatment Detail/Skilled Intervention Sit<>stand edge of bed to FWW CGA with cues for safe hand placement and RLE positioning.   Gait Training   Gait Training Minutes (24462) 15   Symptoms Noted During/After Treatment (Gait Training) fatigue   Treatment Detail/Skilled Intervention Educated on WBAT, safe use of FWW with gait, turns and approach to chair to sit. Cues for posture and reciprocal gait.   Crockett Level (Gait Training) contact guard   Physical Assistance Level (Gait Training) verbal cues   Weight Bearing (Gait Training) weight-bearing as tolerated   Assistive Device (Gait Training) rolling walker   Pattern Analysis (Gait Training) swing-to gait   Gait Analysis Deviations decreased fredo;increased time in double stance;decreased step length;decreased weight-shifting ability   Impairments (Gait Analysis/Training) ROM decreased;strength decreased   PT Discharge Planning   PT Plan R TKA protocol, stairs   PT Discharge Recommendation (DC Rec)   (defer to ortho care team.)   PT Rationale for DC Rec (S)  Requires CGA with all mobility. Spouse inquiring about home PT before going to OP PT.   PT Brief overview of current status Amb 75' with FWW CGA.   Total Session Time   Timed Code Treatment Minutes 30   Total Session Time (sum of timed and untimed services) 45   [facility]  OUTPATIENT PHYSICAL THERAPY EVALUATION  PLAN OF TREATMENT FOR OUTPATIENT REHABILITATION  (COMPLETE FOR INITIAL CLAIMS ONLY)  Patient's Last Name, First Name, M.I.  YOB: 1945  Jhonatan Hewitt                        Provider's Name  [facility] Medical Record No.  [id number]                             Onset Date:  (P) 08/15/23   Start of Care Date:  (P) 08/15/23   Type:     _X_PT   ___OT   ___SLP Medical  Diagnosis:  (P) R TKA              PT Diagnosis:  (P) Impaired functional mobility Visits from SOC:  1     See note for plan of treatment, functional goals and certification details    I CERTIFY THE NEED FOR THESE SERVICES FURNISHED UNDER        THIS PLAN OF TREATMENT AND WHILE UNDER MY CARE     (Physician co-signature of this document indicates review and certification of the therapy plan).

## 2023-08-15 NOTE — ANESTHESIA CARE TRANSFER NOTE
Patient: Jhonatan GUTIÉRREZ Hobday    Procedure: Procedure(s):  RIGHT TOTAL KNEE ARTHROPLASTY       Diagnosis: Osteoarthritis of right knee [M17.11]  Diagnosis Additional Information: No value filed.    Anesthesia Type:   Spinal     Note:    Oropharynx: oropharynx clear of all foreign objects  Level of Consciousness: drowsy  Oxygen Supplementation: face mask  Level of Supplemental Oxygen (L/min / FiO2): 10  Independent Airway: airway patency satisfactory and stable  Dentition: dentition unchanged  Vital Signs Stable: post-procedure vital signs reviewed and stable  Report to RN Given: handoff report given  Patient transferred to: PACU    Handoff Report: Identifed the Patient, Identified the Reponsible Provider, Reviewed the pertinent medical history, Discussed the surgical course, Reviewed Intra-OP anesthesia mangement and issues during anesthesia, Set expectations for post-procedure period and Allowed opportunity for questions and acknowledgement of understanding      Vitals:  Vitals Value Taken Time   BP 92/54 08/15/23 1051   Temp     Pulse 67 08/15/23 1052   Resp 17 08/15/23 1052   SpO2 98 % 08/15/23 1052   Vitals shown include unvalidated device data.    Electronically Signed By: VERONICA Urban CRNA  August 15, 2023  10:53 AM

## 2023-08-15 NOTE — INTERVAL H&P NOTE
"I have reviewed the surgical (or preoperative) H&P that is linked to this encounter, and examined the patient. There are no significant changes    Clinical Conditions Present on Arrival:  Clinically Significant Risk Factors Present on Admission                 # Drug Induced Platelet Defect: home medication list includes an antiplatelet medication  # Obesity: Estimated body mass index is 32.31 kg/m  as calculated from the following:    Height as of this encounter: 1.854 m (6' 1\").    Weight as of this encounter: 111.1 kg (244 lb 14.4 oz).       " S: No fetal movement yet. Denies leaking of fluid, vaginal bleeding, or cramping. No complaints.  Desires TOLAC, had a successful  with last pregnancy.   Will desire intermittent auscultation, not continuous monitoring, willing to sign informed declination.   Will want to labor in a tub.   If she is GBS positive she will want to decline antibiotics, willing to sign an informed declination.   Had declined Varicella immunity, RPR, HIV, and HBsAg.   O:   Visit Vitals  BP 98/56 (BP Location: [facility], Patient Position: Sitting, Cuff Size: Regular)   Wt 62.8 kg   LMP 2018 (Approximate)   BMI 24.53 kg/m²     Positive fetal heart rate.   A: IUP at 12w5d, S=D.   Patient Active Problem List   Diagnosis   • History of  section   • Anxiety   • Hypothyroidism   • Encounter for supervision of other normal pregnancy, first trimester     P: Reviewed normal pregnancy changes and warning s/s early pregnancy.  Pertinent labs reviewed with patient.   Discussed recommendations and procedure for declining recommendations.   Discussed probiotic study, Janna is considering, but isn't sure she wants to run the risk of being in the placebo group.   Consent for TOLAC discussed and signed today.   Return in about 4 weeks (around 3/18/2019) for 16w pnv.    Valorie Triana CNM

## 2023-08-15 NOTE — OR NURSING
Patient moved to OR table with Hover Kit.   Disposable hypoallergenic sheets under patient, on top of patient and covering arms secured to armboards.

## 2023-08-15 NOTE — ANESTHESIA PROCEDURE NOTES
"Intrathecal injection Procedure Note    Pre-Procedure   Staff -        Anesthesiologist:  Celestina Quan MD       Performed By: anesthesiologist       Location: OR       Procedure Start/Stop Times: 8/15/2023 9:10 AM and 8/15/2023 9:14 AM       Pre-Anesthestic Checklist: patient identified, IV checked, risks and benefits discussed, informed consent, monitors and equipment checked, pre-op evaluation, at physician/surgeon's request and post-op pain management  Timeout:       Correct Patient: Yes        Correct Procedure: Yes        Correct Site: Yes        Correct Position: Yes   Procedure Documentation  Procedure: intrathecal injection       Patient Position: sitting       Patient Prep/Sterile Barriers: sterile gloves, mask, patient draped       Skin prep: Chloraprep       Insertion Site: L3-4. (midline approach).       Needle Gauge: 24.        Needle Length (Inches): 4        Spinal Needle Type: Pencan       Introducer used       Introducer: 20 G       # of attempts: 1 and  # of redirects:  0    Assessment/Narrative         Paresthesias: No.       Sensory Level: T9       CSF fluid: clear.    Medication(s) Administered   0.75% Hyperbaric Bupivacaine (Intrathecal) - Intrathecal   1.6 mL - 8/15/2023 9:10:00 AM  Medication Administration Time: 8/15/2023 9:10 AM      FOR [facility] ([facility]) ONLY:   Pain Team Contact information: please page the Pain Team Via [employer]. Search \"Pain\". During daytime hours, please page the attending first. At night please page the resident first.      "

## 2023-08-15 NOTE — PROVIDER NOTIFICATION
Dr. Sanchez Notified that patient's blood pressure has been softer in 90's systolic, but it is climbing up and patient is asymptomatic. Blood pressure systolic is now in 100's and [facility] agrees to send patient up to ortho floor.

## 2023-08-15 NOTE — ANESTHESIA PROCEDURE NOTES
Adductor canal Procedure Note    Pre-Procedure   Staff -        Anesthesiologist:  Celestina Quan MD       Performed By: anesthesiologist       Location: pre-op       Procedure Start/Stop Times: 8/15/2023 8:13 AM and 8/15/2023 8:15 AM       Pre-Anesthestic Checklist: patient identified, IV checked, site marked, risks and benefits discussed, informed consent, monitors and equipment checked, pre-op evaluation, at physician/surgeon's request and post-op pain management  Timeout:       Correct Patient: Yes        Correct Procedure: Yes        Correct Site: Yes        Correct Position: Yes        Correct Laterality: Yes        Site Marked: Yes  Procedure Documentation  Procedure: Adductor canal       Laterality: right       Patient Position: supine       Patient Prep/Sterile Barriers: sterile gloves, mask       Skin prep: Chloraprep       Needle type: stimuplex.       Needle Gauge: 21.        Needle Length (Inches): 6        Ultrasound guided       1. Ultrasound was used to identify targeted nerve, plexus, vascular marker, or fascial plane and place a needle adjacent to it in real-time.       2. Ultrasound was used to visualize the spread of anesthetic in close proximity to the above referenced structure.       3. A permanent image is entered into the patient's record.       4. The visualized anatomic structures appeared normal.       5. There were no apparent abnormal pathologic findings.    Assessment/Narrative         The placement was negative for: blood aspirated, painful injection and site bleeding       Paresthesias: No.       Bolus given via needle..        Secured via.        Insertion/Infusion Method: Single Shot       Complications: none       Injection made incrementally with aspirations every 5 mL.    Medication(s) Administered   Bupivacaine 0.5% w/ 1:200K Epi (Other) - Other   15 mL - 8/15/2023 8:13:00 AM  Medication Administration Time: 8/15/2023 8:13 AM      FOR [facility] ([facility]) ONLY:    "Pain Team Contact information: please page the Pain Team Via [facility]. Search \"Pain\". During daytime hours, please page the attending first. At night please page the resident first.      "

## 2023-08-15 NOTE — OP NOTE
Operative Report    PATIENT:  Jhonatan Hewitt    DATE OF SURGERY:  8/15/2023    SURGEON  Toy Jerry MD.      FIRST ASSISTANT  Misael Jefferson PA-C  (Expert SHILOH assist was required throughout for patient positioning, soft tissue retraction, appropriate use of knee instrumentation, and patient safety)     PREOPERATIVE DIAGNOSIS  right knee osteoarthritis     POSTOPERATIVE DIAGNOSIS  right knee osteoarthritis.         PROCEDURE  right Total Knee Arthroplasty.         ANESTHESIA  Spinal    SPECIMENS: none     ESTIMATED BLOOD LOSS  200cc     INDICATIONS  Mr. Jhonatan Hewitt is a pleasant 77 year old-year-old male with an ongoing history of increasing and progressive pain in the right knee with severe disability. Pain and disability due to knee arthritis are severely affecting quality of life and ability to perform even simple activities of daily living.  X-rays have shown bone-on-bone degenerative change. Consequently after trying and failing all conservative management of knee arthritis, discussion regarding the risk and benefits of knee replacement was undertaken and the patient elected to proceed.     FINDINGS:  The operative knee showed a severe full-thickness cartilage loss on the femur and tibia in the medial compartment of the knee.  The patellofemoral joint also showed advanced arthritic changes.      IMPLANTS  1. Osman, Persona, CR femoral component, size 7 .  2. Osman, Persona, tibial component, size F.  3. Osman, Persona,  all polyethylene articular surface 13 mm thickness.  UC  4. Osman, Persona, all polyethylene patellar button, 38mm diameter      PROCEDURE  Once consent was obtained and the operative site marked in the preop holding area, the patient was brought to the operating room.  Anesthesia was established without difficulty. All bony prominences and the non-operative leg were padded appropriately. The right leg was sterilely prepped and draped in the usual fashion after placement of a  proximal tourniquet.  Tourniquet was never inflated.  A longitudinal incision made over the knee.  Dissection was carried down through the extensor mechanism.  A medial parapatellar arthrotomy  was performed.  The patella was luxed laterally and protected. Standard medial release performed.    An intramedullary guide was used in the femur.  The distal femoral was made at 4 degrees of valgus.  The femoral cutting block  was applied and the rest of the femoral cuts completed. ACL was removed and the PCL was recessed.    Attention was then turned to the tibia.  This was cut using an extramedullary tibial cutting guide perpendicular to its mechanical axis.  The trial tibial and femoral components were then placed and the knee reduced. The patella was resurfaced and found to track well. Flexion and extension gaps were appropriate and varus valgus stability was good.     The knee was copiously irrigated and all bony surfaces dried.  Cement was mixed and all components were cemented and held until firm.  The knee was again trialed.  The  Polyethylene was opened and snapped into place, the locking mechanism was ensured.  The knee was copiously irrigated. The extensor mechanism was closed with # 1 interrupted Vicryl suture. Deep dermis was closed layer-wise of # 2-0 interrupted inverted Vicryl sutures followed by running # 3-0 Monocryl in the skin.  Dressings were applied. The patient tolerated the procedure well and was returned to the postop recovery area in stable condition.          TOY DIALLO MD    @C(1)@  Toy Diallo MD    @C(2)@  Kermit Miller

## 2023-08-15 NOTE — CONSULTS
"[facility]  Consult Note - Hospitalist Service  Date of Admission:  8/15/2023  Consult Requested by: orthopaedics  Reason for Consult: post operative medical management    Assessment & Plan   Jhonatan Hewitt is a 77 year old male admitted s/p R TKA.  He is clinically stable.  Recommendations as below.    # s/p R TKA  - per orthopaedics    # Glaucoma  - continue home eye drops    # HTN  - hold losartan/hctz         Clinically Significant Risk Factors Present on Admission                  # Hypertension: Home medication list includes antihypertensive(s)      # Obesity: Estimated body mass index is 32.31 kg/m  as calculated from the following:    Height as of this encounter: 1.854 m (6' 1\").    Weight as of this encounter: 111.1 kg (244 lb 14.4 oz).              Elias Schneider MD  Hospitalist Service  Securely message with Hand[employer] (more info)  Text page via [facility] Paging/Directory   ______________________________________________________________________    Chief Complaint   S/p R TKA    History is obtained from the patient and family     History of Present Illness   Jhonatan Hewitt is a 77 year old male with hx HLD, glaucoma, HTN, and CVA who is admitted s/p R TKA.    He reports having about six months of right knee pain.  He has been using a cane and then a walker.  He currently reports mild right knee pain.  Denies chest pain/pressure, SOB, or abdominal pain.  He is getting feeling back in the right knee.  Denies tingling in right leg.      Past Medical History    Past Medical History:   Diagnosis Date    Cerebral artery occlusion with cerebral infarction (H)     Cramp of limb     DJD (degenerative joint disease)     R knee    Dyslipidemia     Glaucoma     H/O prostatectomy     2009- s/p radiation therapy n 2011    High blood pressure     History of prostate cancer     Impotence of organic origin     Thrombosis        Past Surgical History   Past Surgical History:   Procedure Laterality Date    " CATARACT IOL, RT/LT Right 2014    ORTHOPEDIC SURGERY Left 2002    hip replacement    PHACOEMULSIFICATION CLEAR CORNEA W/ STANDARD IOL IMPLANT, ENDOSCOPIC CYCLOPHOTOCOAGULATION, COMBINED Right 9/25/2014    Procedure: COMBINED PHACOEMULSIFICATION CLEAR CORNEA WITH STANDARD INTRAOCULAR LENS IMPLANT, ENDOSCOPIC CYCLOPHOTOCOAGULATION;  Surgeon: Romain Duran MD;  Location:  [facility]    PROSTATECTOMY SUPRAPUBIC  7/2009       Medications   I have reviewed this patient's current medications          Physical Exam   Vital Signs: Temp: 97.2  F (36.2  C) Temp src: Oral BP: 110/77 Pulse: 55   Resp: 16 SpO2: 91 % O2 Device: None (Room air) Oxygen Delivery: 10 LPM  Weight: 244 lbs 14.4 oz    Gen:  lying comfortably in bed,  nad  Neuro: alert, conversant  CV: nl rate, regular rhythm  Pulm: no acute resp distress, ctab anteriorly  GI:  abdomen soft, NTTP  MSK:  dressing over right knee    Medical Decision Making             Data

## 2023-08-15 NOTE — PHARMACY-ADMISSION MEDICATION HISTORY
Pharmacist Admission Medication History    Admission medication history is complete. The information provided in this note is only as accurate as the sources available at the time of the update.    Medication reconciliation/reorder completed by provider prior to medication history? No    Information Source(s): Patient and CareEverywhere/SureScripts, patient had own list via in-person    Pertinent Information:     Changes made to PTA medication list:  Added: fish oil, calcium, glucosamine, mutivitamin  Deleted: cialis, metronidazole gel, vitamin D  Changed: None      Allergies reviewed with patient and updates made in EHR: yes    Medication History Completed By: Michaelle Perez RPH 8/15/2023 8:10 AM    Prior to Admission medications    Medication Sig Last Dose Taking? Auth Provider Long Term End Date   ascorbic acid (VITAMIN C) 500 MG CPCR Take 1,000 mg by mouth every evening 8/13/2023 Yes Reported, Patient     aspirin (ASA) 325 MG [facility] tablet Take 325 mg by mouth daily 8/14/2023 at AM Yes Reported, Patient     atorvastatin (LIPITOR) 40 MG tablet Take 40 mg by mouth daily 8/14/2023 at AM Yes Reported, Patient Yes    brimonidine (ALPHAGAN) 0.2 % ophthalmic solution Place 1 drop into both eyes 2 times daily 8/15/2023 at AM Pt brought Yes Reported, Patient     Calcium Carbonate-Vit D-Min (CALCIUM 1200) 9811-5098 MG-UNIT CHEW Take 1 tablet by mouth At Bedtime 8/13/2023 Yes Unknown, Entered By History     dorzolamide-timolol (COSOPT) 2-0.5 % ophthalmic solution Place 1 drop into both eyes 2 times daily 8/15/2023 at AM,, Pt brought Yes Reported, Patient     doxycycline (VIBRAMYCIN) 100 MG capsule Take 100 mg by mouth 2 times daily as needed For rosacea not recent Yes Reported, Patient     Glucosamine-Chondroit-Vit C-Mn (GLUCOSAMINE 1500 COMPLEX) CAPS Take 1 capsule by mouth At Bedtime 8/13/2023 Yes Unknown, Entered By History     latanoprost (XALATAN) 0.005 % ophthalmic solution Place 1 drop into both eyes At Bedtime 8/14/2023  at Pt brought Yes Reported, Patient Yes    losartan-hydrochlorothiazide (HYZAAR) 50-12.5 MG per tablet Take 1 tablet by mouth daily 8/15/2023 at AM Yes Reported, Patient Yes    multivitamin w/minerals (THERA-VIT-M) tablet Take 1 tablet by mouth every evening 8/13/2023 Yes Unknown, Entered By History     netarsudil (RHOPRESSA) 0.02 % ophthalmic solution Place 1 drop into both eyes At Bedtime 8/14/2023 at Pt brought Yes Reported, Patient     Omega-3 Fatty Acids (FISH OIL) 1200 MG capsule Take 1,200 mg by mouth every evening 8/13/2023 Yes Unknown, Entered By History

## 2023-08-16 ENCOUNTER — MEDICAL CORRESPONDENCE (OUTPATIENT)
Dept: HEALTH INFORMATION MANAGEMENT | Facility: CLINIC | Age: 78
End: 2023-08-16

## 2023-08-16 ENCOUNTER — APPOINTMENT (OUTPATIENT)
Dept: OCCUPATIONAL THERAPY | Facility: CLINIC | Age: 78
End: 2023-08-16
Attending: ORTHOPAEDIC SURGERY
Payer: COMMERCIAL

## 2023-08-16 ENCOUNTER — APPOINTMENT (OUTPATIENT)
Dept: PHYSICAL THERAPY | Facility: CLINIC | Age: 78
End: 2023-08-16
Attending: ORTHOPAEDIC SURGERY
Payer: COMMERCIAL

## 2023-08-16 VITALS
TEMPERATURE: 97.8 F | HEART RATE: 76 BPM | RESPIRATION RATE: 17 BRPM | OXYGEN SATURATION: 94 % | SYSTOLIC BLOOD PRESSURE: 121 MMHG | WEIGHT: 244.9 LBS | HEIGHT: 73 IN | DIASTOLIC BLOOD PRESSURE: 71 MMHG | BODY MASS INDEX: 32.46 KG/M2

## 2023-08-16 LAB
GLUCOSE BLDC GLUCOMTR-MCNC: 128 MG/DL (ref 70–99)
HGB BLD-MCNC: 13.5 G/DL (ref 13.3–17.7)

## 2023-08-16 PROCEDURE — 82962 GLUCOSE BLOOD TEST: CPT

## 2023-08-16 PROCEDURE — 97110 THERAPEUTIC EXERCISES: CPT | Mod: GP

## 2023-08-16 PROCEDURE — 85018 HEMOGLOBIN: CPT

## 2023-08-16 PROCEDURE — 97535 SELF CARE MNGMENT TRAINING: CPT | Mod: GO

## 2023-08-16 PROCEDURE — 36415 COLL VENOUS BLD VENIPUNCTURE: CPT

## 2023-08-16 PROCEDURE — 97116 GAIT TRAINING THERAPY: CPT | Mod: GP

## 2023-08-16 PROCEDURE — 97165 OT EVAL LOW COMPLEX 30 MIN: CPT | Mod: GO

## 2023-08-16 PROCEDURE — 250N000011 HC RX IP 250 OP 636: Mod: JZ | Performed by: ORTHOPAEDIC SURGERY

## 2023-08-16 RX ADMIN — CEFAZOLIN SODIUM 2 G: 2 INJECTION, SOLUTION INTRAVENOUS at 02:20

## 2023-08-16 ASSESSMENT — ACTIVITIES OF DAILY LIVING (ADL)
ADLS_ACUITY_SCORE: 37
ADLS_ACUITY_SCORE: 36
ADLS_ACUITY_SCORE: 37
ADLS_ACUITY_SCORE: 36

## 2023-08-16 NOTE — PROGRESS NOTES
Patient vital signs are at baseline: Yes  Patient able to ambulate as they were prior to admission or with assist devices provided by therapies during their stay:  Yes  Patient MUST void prior to discharge:  Yes  Patient able to tolerate oral intake:  Yes  Pain has adequate pain control using Oral analgesics:  Yes  Does patient have an identified :  Yes  Has goal D/C date and time been discussed with patient:  Yes    Pleasant A&O x4. Pt reporting minimal pain, no prns given. Voiding - brief provided due to incontinence. Patient brought sheets from home due to allergy. Kryo cuff set up for use this evening.

## 2023-08-16 NOTE — PROGRESS NOTES
Discharge:    Patient vital signs are at baseline: Yes  Patient able to ambulate as they were prior to admission or with assist devices provided by therapies during their stay:  Yes  Patient MUST void prior to discharge:  Yes  Patient able to tolerate oral intake:  Yes  Pain has adequate pain control using Oral analgesics:  Yes  Does patient have an identified :  Yes  Has goal D/C date and time been discussed with patient:  Yes    Bleeding at surgical site this morning- Dressing changed by avery IBANEZ. No further bleeding noted. AVS gone over with patient. Education provided. Wife to transport home.

## 2023-08-16 NOTE — PROGRESS NOTES
Patient vital signs are at baseline: Yes  Patient able to ambulate as they were prior to admission or with assist devices provided by therapies during their stay:  Yes  Patient MUST void prior to discharge:  Yes, incontinent of bladder  Patient able to tolerate oral intake:  Yes  Pain has adequate pain control using Oral analgesics:  Yes, utilizing Kyro cuff for pain management, declined PRN medications, rated pain 0-2 throughout the shift.  Does patient have an identified :  Yes  Has goal D/C date and time been discussed with patient:  Yes     Alert and oriented x 4, able to make needs known, CMS intact, ace wrap in place, dsg CDI, foot has +2 pitting edema, encouraged elevation , PIV SL,  plan is home after therapies

## 2023-08-16 NOTE — DISCHARGE INSTRUCTIONS
Home Care set up with   [facility]  [phone]  They will contact you to set up initial visit

## 2023-08-16 NOTE — CONSULTS
Care Management Initial Consult    General Information  Assessment completed with: Patient, Spouse or significant other,    Type of CM/SW Visit: Initial Assessment    Primary Care Provider verified and updated as needed:     Readmission within the last 30 days:        Reason for Consult: discharge planning  Advance Care Planning:            Communication Assessment  Patient's communication style: spoken language (English or Bilingual)             Cognitive  Cognitive/Neuro/Behavioral: WDL                      Living Environment:   People in home: spouse     Current living Arrangements: house      Able to return to prior arrangements:         Family/Social Support:  Care provided by:    Provides care for:                  Description of Support System:           Current Resources:   Patient receiving home care services:       Community Resources:    Equipment currently used at home: walker, standard  Supplies currently used at home:      Employment/Financial:  Employment Status:          Financial Concerns:             Does the patient's insurance plan have a 3 day qualifying hospital stay waiver?  No    Lifestyle & Psychosocial Needs:  Social Determinants of Health     Tobacco Use: Medium Risk (8/16/2023)    Patient History     Smoking Tobacco Use: Former     Smokeless Tobacco Use: Never     Passive Exposure: Not on file   Alcohol Use: Not on file   Financial Resource Strain: Not on file   Food Insecurity: Not on file   Transportation Needs: Not on file   Physical Activity: Not on file   Stress: Not on file   Social Connections: Not on file   Intimate Partner Violence: Not on file   Depression: Not on file   Housing Stability: Not on file       Functional Status:  Prior to admission patient needed assistance:              Mental Health Status:          Chemical Dependency Status:                Values/Beliefs:  Spiritual, Cultural Beliefs, Sikhism Practices, Values that affect care:               Care Management  Discharge Note    Discharge Date: 08/16/2023       Discharge Disposition: Home, Home Care    Discharge Services:      Discharge DME:      Discharge Transportation:      Private pay costs discussed: Not applicable    Does the patient's insurance plan have a 3 day qualifying hospital stay waiver?  No    PAS Confirmation Code:    Patient/family educated on Medicare website which has current facility and service quality ratings:      Education Provided on the Discharge Plan:    Persons Notified of Discharge Plans: Pt, wife, and [facility]  Patient/Family in Agreement with the Plan:  yes    Handoff Referral Completed: No    Additional Information:  Initial assessment completed.  Pt lives with wife in private residence.  Anticipate home at discharge.  Discussed recommendation for home care PT and OT, pt is in agreement.  Referral made and accepted by [facility].    TRISTIAN Pagan

## 2023-08-16 NOTE — PROGRESS NOTES
"Orthopedic Progress Note      Assessment: 1 Day Post-Op  S/P Procedure(s):  RIGHT TOTAL KNEE ARTHROPLASTY @    Plan:   - Continue PT/OT.   - Weightbearing status: WBAT.  - Anticoagulation: Xarelto, hold todays dose start tomorrow in addition to SCDs, mary stockings and early ambulation.  - Dressing noted to be soaked this AM, changed and pressure dressing applied.  - Discharge planning: Discharge pending drainage from wound. Could discharge midday if drainage slows     Subjective:  Pain: Well controlled on Tylenol & oxycodone.  Nausea, Vomiting:  No  Chest pain: No  Lightheadedness, Dizziness:  No  Neuro:  Patient denies new onset numbness or paresthesias in right lower extremity     Patient is doing well on POD #1. Ambulating, tolerating oral intake, voiding & pain is controlled with oral medication. Ready for discharge. Hgb pending (pre-op 15.9).     Objective:  /61 (BP Location: Right arm)   Pulse 80   Temp 98.5  F (36.9  C) (Oral)   Resp 18   Ht 1.854 m (6' 1\")   Wt 111.1 kg (244 lb 14.4 oz)   SpO2 94%   BMI 32.31 kg/m    The patient is A&Ox3. Appears comfortable, sitting up at bedside.  Sensation is intact to light touch & equal bilaterally in the L2 through S1 dermatomes.  Calves are soft and non-tender.  Dorsiflexion and plantar flexion is intact bilaterally.  Appropriate flexion and extension of the toes bilaterally.   Brisk capillary refill in the toes bilaterally.   Palpable right dorsalis pedis pulse.  right knee dressing C/D/I.      Pertinent Labs   Lab Results: personally reviewed.   No results found for: INR, PROTIME  No results found for: WBC, HGB, HCT, MCV, PLT  No results found for: NA, CO2      Report completed by:  Kimberly Alvarez PA-C/Dr. [facility]    Date: 8/16/2023  Time: 8:44 AM    "

## 2023-08-16 NOTE — PROGRESS NOTES
08/16/23 0905   Appointment Info   Signing Clinician's Name / Credentials (OT) Diandra Reynolds OTR/L   Rehab Comments (OT) OT Evaluation   Living Environment   People in Home spouse   Current Living Arrangements house   Living Environment Comments WIS, RTS, shower chair and tub/shower w/hand held wand   Self-Care   Usual Activity Tolerance good   Current Activity Tolerance moderate   Instrumental Activities of Daily Living (IADL)   IADL Comments Pt is independent with all ADls w/the exception of LE dressing and supporting his R LE in/out of bed.   General Information   Onset of Illness/Injury or Date of Surgery 08/15/23   Referring Physician Toy Jerry MD   Patient/Family Therapy Goal Statement (OT) To go home today once bleeding stops   Right Upper Extremity (Weight-bearing Status) weight-bearing as tolerated (WBAT)   Cognitive Status Examination   Cognitive Status Comments Pt following directions, answering questions and following dirctions   Pain Assessment   Patient Currently in Pain No   Range of Motion Comprehensive   General Range of Motion bilateral upper extremity ROM WFL   Strength Comprehensive (MMT)   General Manual Muscle Testing (MMT) Assessment no strength deficits identified   Coordination   Upper Extremity Coordination No deficits were identified   Bed Mobility   Bed Mobility sit-supine;supine-sit   Supine-Sit [place] (Bed Mobility) supervision;verbal cues   Sit-Supine [place] (Bed Mobility) supervision;verbal cues   Transfers   Transfers bed-chair transfer;sit-stand transfer;toilet transfer   Transfer Skill: Bed to Chair/Chair to Bed   Bed-Chair [place] (Transfers) supervision;verbal cues   Sit-Stand Transfer   Sit-Stand [place] (Transfers) supervision;verbal cues   Toilet Transfer   Type (Toilet Transfer) sit-stand;stand-sit   [place] Level (Toilet Transfer) supervision;verbal cues   Balance   Balance Assessment standing balance: dynamic   Balance Comments SBA    Activities of Daily Living   BADL Assessment/Intervention lower body dressing   Lower Body Dressing Assessment/Training   [place] Level (Lower Body Dressing) moderate assist (50% patient effort)   Clinical Impression   Criteria for Skilled Therapeutic Interventions Met (OT) Yes, treatment indicated   OT Diagnosis R TKA, h/o of CVA   OT Problem List-Impairments impacting ADL flexibility;mobility   Assessment of Occupational Performance 1-3 Performance Deficits   Identified Performance Deficits dressing, toileting, STS , walker safety, bed mobility   Clinical Decision Making Complexity (OT) low complexity   Anticipated Equipment Needs Upon Discharge (OT)   (commode or grab bars near toilet)   Risk & Benefits of therapy have been explained evaluation/treatment results reviewed   OT Total Evaluation Time   OT Eval, Low Complexity Minutes (82639) 10   OT Goals   Therapy Frequency (OT) One time eval and treatment   OT Predicted Duration/Target Date for Goal Attainment 08/16/23   OT Goals Lower Body Dressing;Bed Mobility;Toilet Transfer/Toileting   OT: Lower Body Dressing Modified independent;using adaptive equipment;Goal Met   OT: Bed Mobility Modified independent;Goal Met   OT: Toilet Transfer/Toileting Modified independent;Goal Met   Interventions   Interventions Quick Adds Self-Care/Home Management   Self-Care/Home Management   Self-Care/Home Mgmt/ADL, Compensatory, Meal Prep Minutes (63948) 23   Symptoms Noted During/After Treatment (Meal Preparation/Planning Training) none   Treatment Detail/Skilled Intervention Pt in chair upon arrival with spouse present and agreeable to OT. Ed pt on AE and completed LE dressing w/reacher & sock aide for  socks and demo how   pt would don pants using reacher. Ed and demo for pt and spouse shower chair transfer w/chair and with side stepping. Mod I w/toilet transfer w/commode over toilet, pt struggled to not utilize both grab bars therefore encouraged family to consider a  commode or grab bars if needed at home. Ed and demo car transfer with the option of a leg  if need and walker  with in home environment. Pt mod I w/bed mobility. Provided handout answered all AE questions and ed on options of where to purchase AE.   OT Discharge Planning   OT Plan DC OT   OT Discharge Recommendation (DC Rec)   (Defer to ortho)   OT Rationale for DC Rec Pt has good family support and is Mod I w/fx mobility and ADLs.   OT Brief overview of current status Mod I w/fxl mob and ADLs   Total Session Time   Timed Code Treatment Minutes 23   Total Session Time (sum of timed and untimed services) 33      [facility]  OUTPATIENT OCCUPATIONAL THERAPY  EVALUATION  PLAN OF TREATMENT FOR OUTPATIENT REHABILITATION  (COMPLETE FOR INITIAL CLAIMS ONLY)  Patient's Last Name, First Name, M.I.  YOB: 1945  Jhonatan Hewitt                          Provider's Name  [facility] Medical Record No.  [id number]                             Onset Date:  08/15/23   Start of Care Date:      Type:     ___PT   _X_OT   ___SLP Medical Diagnosis:                       OT Diagnosis:  R TKA, h/o of CVA Visits from SOC:  1     See note for plan of treatment, functional goals and certification details    I CERTIFY THE NEED FOR THESE SERVICES FURNISHED UNDER        THIS PLAN OF TREATMENT AND WHILE UNDER MY CARE     (Physician co-signature of this document indicates review and certification of the therapy plan).                            Occupational Therapy Discharge Summary    Reason for therapy discharge:    Discharged to home.    Progress towards therapy goal(s). See goals on Care Plan in [facility] electronic health record for goal details.  Goals met    Therapy recommendation(s):    No further therapy is recommended.Has good family support and is Mod I with functional mob and ADLs and good knowledge of potential AE needs once home.

## 2023-08-16 NOTE — DISCHARGE SUMMARY
"ORTHOPEDIC DISCHARGE SUMMARY       Jhonatan Hewitt,  1945, MRN [id number]    Admission Date: 8/15/2023      Admission Diagnoses: Osteoarthritis of right knee [M17.11]  Knee osteoarthritis [M17.9]     Discharge Date:  23     Post-operative Day:  1 Day Post-Op    Reason for Admission: The patient was admitted for the following: Procedure(s):  RIGHT TOTAL KNEE ARTHROPLASTY    BRIEF HOSPITAL COURSE   Jhonatan Hewitt is a pleasant 77 year old male who underwent the aforementioned procedure with Dr. Jerry on 8/15. There were no intraoperative complications and the patient was transferred to the recovery room and later the orthopedic unit in stable condition. Once the patient reached the orthopedic floor our orthopedic pain protocol was implemented along with the following:    Anticoagulation Medications: Xarelto  Therapy: PT and OT  Activity: WBAT  Bracing: None    Consultations during Admission: Hospitalist service for medical management     COMPLICATIONS/SIGNIFICANT FINDINGS    NONE    DISCHARGE INFORMATION   Condition at discharge: Good  Discharge destination: Home with home cares  Patient was seen by myself on the date of discharge.    FOLLOW UP CARE   Follow up with orthopedics in 2 weeks or sooner should the need arise. Ortho will continue to manage pain control, post op anticoagulation and incision care.     Follow up with your PCP for management of chronic medical problems and to evaluate for post op medical complications including constipation, nausea/vomiting, DVT/PE, anemia, changes in blood pressure, fevers/chills, urinary retention and atelectasis/pneumonia.     Subjective   Patient is doing well on POD #1. Pain is well controlled with oral medications. Ambulating. Tolerating oral intake.     Physical Exam   /71 (BP Location: Right arm)   Pulse 76   Temp 97.8  F (36.6  C) (Oral)   Resp 17   Ht 1.854 m (6' 1\")   Wt 111.1 kg (244 lb 14.4 oz)   SpO2 94%   BMI 32.31 kg/m    The patient " is A&Ox3. Appears comfortable, sitting up at bedside.  Sensation is intact to light touch & equal bilaterally in the L2 through S1 dermatomes.  Calves are soft and non-tender.  Dorsiflexion and plantar flexion is intact bilaterally.  Appropriate flexion and extension of the toes bilaterally.   Brisk capillary refill in the toes bilaterally.   Palpable right dorsalis pedis pulse.  right knee dressing C/D/I.      Pertinent Results at Discharge     Hemoglobin   Date/Time Value Ref Range Status   08/16/2023 10:10 AM 13.5 13.3 - 17.7 g/dL Final       Problem List   Principal Problem:    Knee osteoarthritis      Kimberly Alvarez PA-C/Dr. Jerry  [facility]  [phone]  Date: 8/16/2023  Time: 11:25 AM

## 2023-08-16 NOTE — PROGRESS NOTES
Physical Therapy Discharge Summary    Reason for therapy discharge:    All goals and outcomes met, no further needs identified.    Progress towards therapy goal(s). See goals on Care Plan in [facility] electronic health record for goal details.  Goals met    Therapy recommendation(s):    Continue with home ex and amb program as instructed. Has OP PT already set up.

## 2023-11-29 ENCOUNTER — HOSPITAL ENCOUNTER (OUTPATIENT)
Facility: CLINIC | Age: 78
Setting detail: OBSERVATION
Discharge: HOME | End: 2023-11-30
Attending: EMERGENCY MEDICINE | Admitting: SURGERY
Payer: COMMERCIAL

## 2023-11-29 ENCOUNTER — APPOINTMENT (OUTPATIENT)
Dept: CT IMAGING | Facility: CLINIC | Age: 78
End: 2023-11-29
Attending: EMERGENCY MEDICINE
Payer: COMMERCIAL

## 2023-11-29 DIAGNOSIS — K35.30 ACUTE APPENDICITIS WITH LOCALIZED PERITONITIS, WITHOUT PERFORATION, ABSCESS, OR GANGRENE: ICD-10-CM

## 2023-11-29 LAB
ALBUMIN SERPL BCG-MCNC: 3.8 G/DL (ref 3.5–5.2)
ALBUMIN UR-MCNC: NEGATIVE MG/DL
ALP SERPL-CCNC: 80 U/L (ref 40–150)
ALT SERPL W P-5'-P-CCNC: 21 U/L (ref 0–70)
ANION GAP SERPL CALCULATED.3IONS-SCNC: 8 MMOL/L (ref 7–15)
APPEARANCE UR: CLEAR
AST SERPL W P-5'-P-CCNC: 27 U/L (ref 0–45)
BASOPHILS # BLD AUTO: 0 10E3/UL (ref 0–0.2)
BASOPHILS NFR BLD AUTO: 0 %
BILIRUB SERPL-MCNC: 0.6 MG/DL
BILIRUB UR QL STRIP: NEGATIVE
BUN SERPL-MCNC: 15.7 MG/DL (ref 8–23)
CALCIUM SERPL-MCNC: 8.7 MG/DL (ref 8.8–10.2)
CHLORIDE SERPL-SCNC: 103 MMOL/L (ref 98–107)
COLOR UR AUTO: YELLOW
CREAT SERPL-MCNC: 0.88 MG/DL (ref 0.67–1.17)
DEPRECATED HCO3 PLAS-SCNC: 27 MMOL/L (ref 22–29)
EGFRCR SERPLBLD CKD-EPI 2021: 87 ML/MIN/1.73M2
EOSINOPHIL # BLD AUTO: 0.1 10E3/UL (ref 0–0.7)
EOSINOPHIL NFR BLD AUTO: 2 %
ERYTHROCYTE [DISTWIDTH] IN BLOOD BY AUTOMATED COUNT: 14.3 % (ref 10–15)
GLUCOSE SERPL-MCNC: 92 MG/DL (ref 70–99)
GLUCOSE UR STRIP-MCNC: NEGATIVE MG/DL
HCT VFR BLD AUTO: 47.9 % (ref 40–53)
HGB BLD-MCNC: 15.4 G/DL (ref 13.3–17.7)
HGB UR QL STRIP: NEGATIVE
HOLD SPECIMEN: NORMAL
HOLD SPECIMEN: NORMAL
IMM GRANULOCYTES # BLD: 0 10E3/UL
IMM GRANULOCYTES NFR BLD: 0 %
KETONES UR STRIP-MCNC: NEGATIVE MG/DL
LEUKOCYTE ESTERASE UR QL STRIP: NEGATIVE
LYMPHOCYTES # BLD AUTO: 1.2 10E3/UL (ref 0.8–5.3)
LYMPHOCYTES NFR BLD AUTO: 15 %
MCH RBC QN AUTO: 29 PG (ref 26.5–33)
MCHC RBC AUTO-ENTMCNC: 32.2 G/DL (ref 31.5–36.5)
MCV RBC AUTO: 90 FL (ref 78–100)
MONOCYTES # BLD AUTO: 0.7 10E3/UL (ref 0–1.3)
MONOCYTES NFR BLD AUTO: 9 %
MUCOUS THREADS #/AREA URNS LPF: PRESENT /LPF
NEUTROPHILS # BLD AUTO: 5.6 10E3/UL (ref 1.6–8.3)
NEUTROPHILS NFR BLD AUTO: 74 %
NITRATE UR QL: NEGATIVE
NRBC # BLD AUTO: 0 10E3/UL
NRBC BLD AUTO-RTO: 0 /100
PH UR STRIP: 6 [PH] (ref 5–7)
PLATELET # BLD AUTO: 188 10E3/UL (ref 150–450)
POTASSIUM SERPL-SCNC: 4.5 MMOL/L (ref 3.4–5.3)
PROT SERPL-MCNC: 6.6 G/DL (ref 6.4–8.3)
RBC # BLD AUTO: 5.31 10E6/UL (ref 4.4–5.9)
RBC URINE: 1 /HPF
SODIUM SERPL-SCNC: 138 MMOL/L (ref 135–145)
SP GR UR STRIP: 1.02 (ref 1–1.03)
UROBILINOGEN UR STRIP-MCNC: NORMAL MG/DL
WBC # BLD AUTO: 7.6 10E3/UL (ref 4–11)
WBC URINE: 1 /HPF

## 2023-11-29 PROCEDURE — 85025 COMPLETE CBC W/AUTO DIFF WBC: CPT | Performed by: EMERGENCY MEDICINE

## 2023-11-29 PROCEDURE — 74177 CT ABD & PELVIS W/CONTRAST: CPT

## 2023-11-29 PROCEDURE — G0378 HOSPITAL OBSERVATION PER HR: HCPCS

## 2023-11-29 PROCEDURE — 99285 EMERGENCY DEPT VISIT HI MDM: CPT | Mod: 25

## 2023-11-29 PROCEDURE — 96365 THER/PROPH/DIAG IV INF INIT: CPT | Mod: XU

## 2023-11-29 PROCEDURE — 250N000011 HC RX IP 250 OP 636: Performed by: EMERGENCY MEDICINE

## 2023-11-29 PROCEDURE — 36415 COLL VENOUS BLD VENIPUNCTURE: CPT | Performed by: EMERGENCY MEDICINE

## 2023-11-29 PROCEDURE — 80053 COMPREHEN METABOLIC PANEL: CPT | Performed by: EMERGENCY MEDICINE

## 2023-11-29 PROCEDURE — 250N000009 HC RX 250: Performed by: EMERGENCY MEDICINE

## 2023-11-29 PROCEDURE — 81001 URINALYSIS AUTO W/SCOPE: CPT | Performed by: EMERGENCY MEDICINE

## 2023-11-29 PROCEDURE — 96367 TX/PROPH/DG ADDL SEQ IV INF: CPT

## 2023-11-29 RX ORDER — HYDROMORPHONE HCL IN WATER/PF 6 MG/30 ML
0.2 PATIENT CONTROLLED ANALGESIA SYRINGE INTRAVENOUS
Status: DISCONTINUED | OUTPATIENT
Start: 2023-11-29 | End: 2023-11-30 | Stop reason: HOSPADM

## 2023-11-29 RX ORDER — AMOXICILLIN 250 MG
2 CAPSULE ORAL 2 TIMES DAILY PRN
Status: DISCONTINUED | OUTPATIENT
Start: 2023-11-29 | End: 2023-11-30 | Stop reason: HOSPADM

## 2023-11-29 RX ORDER — SODIUM CHLORIDE 9 MG/ML
INJECTION, SOLUTION INTRAVENOUS CONTINUOUS
Status: DISCONTINUED | OUTPATIENT
Start: 2023-11-29 | End: 2023-11-30 | Stop reason: HOSPADM

## 2023-11-29 RX ORDER — AMOXICILLIN 250 MG
1 CAPSULE ORAL 2 TIMES DAILY PRN
Status: DISCONTINUED | OUTPATIENT
Start: 2023-11-29 | End: 2023-11-30 | Stop reason: HOSPADM

## 2023-11-29 RX ORDER — CEFTRIAXONE 1 G/1
1 INJECTION, POWDER, FOR SOLUTION INTRAMUSCULAR; INTRAVENOUS ONCE
Status: COMPLETED | OUTPATIENT
Start: 2023-11-29 | End: 2023-11-29

## 2023-11-29 RX ORDER — ASPIRIN 325 MG
325 TABLET, DELAYED RELEASE (ENTERIC COATED) ORAL DAILY
COMMUNITY

## 2023-11-29 RX ORDER — ONDANSETRON 4 MG/1
4 TABLET, ORALLY DISINTEGRATING ORAL EVERY 6 HOURS PRN
Status: DISCONTINUED | OUTPATIENT
Start: 2023-11-29 | End: 2023-11-30 | Stop reason: HOSPADM

## 2023-11-29 RX ORDER — LOSARTAN POTASSIUM 50 MG/1
50 TABLET ORAL DAILY
Status: DISCONTINUED | OUTPATIENT
Start: 2023-11-30 | End: 2023-11-30 | Stop reason: HOSPADM

## 2023-11-29 RX ORDER — DORZOLAMIDE HYDROCHLORIDE AND TIMOLOL MALEATE 20; 5 MG/ML; MG/ML
1 SOLUTION/ DROPS OPHTHALMIC 2 TIMES DAILY
Status: DISCONTINUED | OUTPATIENT
Start: 2023-11-29 | End: 2023-11-30 | Stop reason: HOSPADM

## 2023-11-29 RX ORDER — ONDANSETRON 2 MG/ML
4 INJECTION INTRAMUSCULAR; INTRAVENOUS EVERY 6 HOURS PRN
Status: DISCONTINUED | OUTPATIENT
Start: 2023-11-29 | End: 2023-11-30 | Stop reason: HOSPADM

## 2023-11-29 RX ORDER — NALOXONE HYDROCHLORIDE 0.4 MG/ML
0.2 INJECTION, SOLUTION INTRAMUSCULAR; INTRAVENOUS; SUBCUTANEOUS
Status: DISCONTINUED | OUTPATIENT
Start: 2023-11-29 | End: 2023-11-30 | Stop reason: HOSPADM

## 2023-11-29 RX ORDER — MULTIVIT WITH MINERALS/LUTEIN
1000 TABLET ORAL DAILY
COMMUNITY

## 2023-11-29 RX ORDER — NALOXONE HYDROCHLORIDE 0.4 MG/ML
0.4 INJECTION, SOLUTION INTRAMUSCULAR; INTRAVENOUS; SUBCUTANEOUS
Status: DISCONTINUED | OUTPATIENT
Start: 2023-11-29 | End: 2023-11-30 | Stop reason: HOSPADM

## 2023-11-29 RX ORDER — LATANOPROST 50 UG/ML
1 SOLUTION/ DROPS OPHTHALMIC AT BEDTIME
Status: DISCONTINUED | OUTPATIENT
Start: 2023-11-29 | End: 2023-11-30 | Stop reason: HOSPADM

## 2023-11-29 RX ORDER — POLYETHYLENE GLYCOL 3350 17 G/17G
17 POWDER, FOR SOLUTION ORAL 2 TIMES DAILY PRN
Status: DISCONTINUED | OUTPATIENT
Start: 2023-11-29 | End: 2023-11-30 | Stop reason: HOSPADM

## 2023-11-29 RX ORDER — LOSARTAN POTASSIUM 50 MG/1
50 TABLET ORAL DAILY
COMMUNITY

## 2023-11-29 RX ORDER — HYDROMORPHONE HYDROCHLORIDE 2 MG/1
2 TABLET ORAL EVERY 4 HOURS PRN
Status: DISCONTINUED | OUTPATIENT
Start: 2023-11-29 | End: 2023-11-30 | Stop reason: HOSPADM

## 2023-11-29 RX ORDER — CEFTRIAXONE 1 G/1
1 INJECTION, POWDER, FOR SOLUTION INTRAMUSCULAR; INTRAVENOUS EVERY 24 HOURS
Status: DISCONTINUED | OUTPATIENT
Start: 2023-11-30 | End: 2023-11-30 | Stop reason: HOSPADM

## 2023-11-29 RX ORDER — ACETAMINOPHEN 325 MG/1
650 TABLET ORAL EVERY 4 HOURS PRN
Status: DISCONTINUED | OUTPATIENT
Start: 2023-11-29 | End: 2023-11-30 | Stop reason: HOSPADM

## 2023-11-29 RX ORDER — IOPAMIDOL 755 MG/ML
500 INJECTION, SOLUTION INTRAVASCULAR ONCE
Status: COMPLETED | OUTPATIENT
Start: 2023-11-29 | End: 2023-11-29

## 2023-11-29 RX ORDER — ACETAMINOPHEN 650 MG/1
650 SUPPOSITORY RECTAL EVERY 4 HOURS PRN
Status: DISCONTINUED | OUTPATIENT
Start: 2023-11-29 | End: 2023-11-30 | Stop reason: HOSPADM

## 2023-11-29 RX ORDER — HYDROMORPHONE HCL IN WATER/PF 6 MG/30 ML
0.4 PATIENT CONTROLLED ANALGESIA SYRINGE INTRAVENOUS
Status: DISCONTINUED | OUTPATIENT
Start: 2023-11-29 | End: 2023-11-30 | Stop reason: HOSPADM

## 2023-11-29 RX ORDER — HYDROMORPHONE HYDROCHLORIDE 1 MG/ML
0.5 INJECTION, SOLUTION INTRAMUSCULAR; INTRAVENOUS; SUBCUTANEOUS
Status: DISCONTINUED | OUTPATIENT
Start: 2023-11-29 | End: 2023-11-30 | Stop reason: HOSPADM

## 2023-11-29 RX ORDER — METRONIDAZOLE 500 MG/100ML
500 INJECTION, SOLUTION INTRAVENOUS ONCE
Status: COMPLETED | OUTPATIENT
Start: 2023-11-29 | End: 2023-11-29

## 2023-11-29 RX ORDER — BRIMONIDINE TARTRATE 2 MG/ML
1 SOLUTION/ DROPS OPHTHALMIC 2 TIMES DAILY
Status: DISCONTINUED | OUTPATIENT
Start: 2023-11-29 | End: 2023-11-30 | Stop reason: HOSPADM

## 2023-11-29 RX ADMIN — CEFTRIAXONE 1 G: 1 INJECTION, POWDER, FOR SOLUTION INTRAMUSCULAR; INTRAVENOUS at 15:30

## 2023-11-29 RX ADMIN — SODIUM CHLORIDE 65 ML: 9 INJECTION, SOLUTION INTRAVENOUS at 14:41

## 2023-11-29 RX ADMIN — IOPAMIDOL 100 ML: 755 INJECTION, SOLUTION INTRAVENOUS at 14:41

## 2023-11-29 RX ADMIN — METRONIDAZOLE 500 MG: 500 INJECTION, SOLUTION INTRAVENOUS at 15:54

## 2023-11-29 ASSESSMENT — ACTIVITIES OF DAILY LIVING (ADL)
ADLS_ACUITY_SCORE: 35
ADLS_ACUITY_SCORE: 37

## 2023-11-29 NOTE — PROVIDER NOTIFICATION
Paged Gen Surg for admit orders. Daniel in surgery, asked if ER MD could enter those orders. Called ERB for assistance. Diet order and dilauded prn added.

## 2023-11-29 NOTE — PROGRESS NOTES
"[facility]    ED Boarding Nurse Handoff Addendum Report:    Date/time: 11/29/2023, 4:43 PM    Activity Level: standby and cane    Fall Risk: Yes:  nonskid shoes/slippers when out of bed, patient and family education, assistive device/personal items within reach, activity supervised, and mobility aid in reach    Active Infusions: none    Current Meds Due: See MAR    Current care needs: IV antibiotics. Laparoscopic appendectomy, will be scheduled for tomorrow morning because patient had recently eaten. NPO at midnight.      Oxygen requirements (liters/min and/or FiO2): none     Respiratory status: Room air    Vital signs (within last 30 minutes):    Vitals:    11/29/23 1214 11/29/23 1530 11/29/23 1532 11/29/23 1600   BP: (!) 143/86 (!) 130/90  (!) 141/81   BP Location:    Left arm   Pulse: 70 76  70   Resp: 20  18 16   Temp: 97.7  F (36.5  C)   98.4  F (36.9  C)   TempSrc: Oral   Oral   SpO2: 97%  97% 97%   Weight: 111.1 kg (245 lb)      Height: 1.854 m (6' 1\")          Focused assessment within last 30 minutes:    A&Ox4. Regular diet per surgeon, NPO at midnight. RLQ abdominal pain onset last night, denies pain at this time. Denies nausea, vomiting, fever, or chills. Afebrile now. Abdomen rounded, BS active x4, some RLQ tenderness with palpation. Had IV rocephin and IV flagyl.     Patient reports allergy to hospital sheets/bedding, blister like rash. Wife bringing sheets from home.     ED Boarding Nurse name: Carol Castaneda RN   "

## 2023-11-29 NOTE — ED NOTES
"[facility]  ED Nurse Handoff Report    ED Chief complaint: Abdominal Pain  . ED Diagnosis:   Final diagnoses:   Acute appendicitis with localized peritonitis, without perforation, abscess, or gangrene       Allergies:   Allergies   Allergen Reactions    Soap & Cleansers Rash     Severe rash from detergent on hospital linens    Amoxicillin-Pot Clavulanate Rash    Latex Rash    Percocet [Oxycodone-Acetaminophen] Rash       Code Status: Full    Activity level - Baseline/Home:  independent.  Activity Level - Current:   standby.   Lift room needed: No.   Bariatric: No   Needed: No   Isolation: No.   Infection: Not Applicable.     Respiratory status: Room air    Vital Signs (within 30 minutes):   Vitals:    11/29/23 1214 11/29/23 1530 11/29/23 1532   BP: (!) 143/86 (!) 130/90    Pulse: 70 76    Resp: 20  18   Temp: 97.7  F (36.5  C)     TempSrc: Oral     SpO2: 97%  97%   Weight: 111.1 kg (245 lb)     Height: 1.854 m (6' 1\")         Cardiac Rhythm:  ,      Pain level:    Patient confused: No.   Patient Falls Risk: nonskid shoes/slippers when out of bed, arm band in place, patient and family education, and assistive device/personal items within reach.   Elimination Status: Has voided     Patient Report - Initial Complaint: Abdominal pain.   Focused Assessment:        GastrointestinalGastrointestinal WDL: .WDL except; GI symptomsGI Signs/Symptoms: abdominal discomfortAbdominal Appearance: nondistended       Abnormal Results:   Labs Ordered and Resulted from Time of ED Arrival to Time of ED Departure   COMPREHENSIVE METABOLIC PANEL - Abnormal       Result Value    Sodium 138      Potassium 4.5      Carbon Dioxide (CO2) 27      Anion Gap 8      Urea Nitrogen 15.7      Creatinine 0.88      GFR Estimate 87      Calcium 8.7 (*)     Chloride 103      Glucose 92      Alkaline Phosphatase 80      AST 27      ALT 21      Protein Total 6.6      Albumin 3.8      Bilirubin Total 0.6     ROUTINE UA WITH " MICROSCOPIC REFLEX TO CULTURE - Abnormal    Color Urine Yellow      Appearance Urine Clear      Glucose Urine Negative      Bilirubin Urine Negative      Ketones Urine Negative      Specific Gravity Urine 1.025      Blood Urine Negative      pH Urine 6.0      Protein Albumin Urine Negative      Urobilinogen Urine Normal      Nitrite Urine Negative      Leukocyte Esterase Urine Negative      Mucus Urine Present (*)     RBC Urine 1      WBC Urine 1     CBC WITH PLATELETS AND DIFFERENTIAL    WBC Count 7.6      RBC Count 5.31      Hemoglobin 15.4      Hematocrit 47.9      MCV 90      MCH 29.0      MCHC 32.2      RDW 14.3      Platelet Count 188      % Neutrophils 74      % Lymphocytes 15      % Monocytes 9      % Eosinophils 2      % Basophils 0      % Immature Granulocytes 0      NRBCs per 100 WBC 0      Absolute Neutrophils 5.6      Absolute Lymphocytes 1.2      Absolute Monocytes 0.7      Absolute Eosinophils 0.1      Absolute Basophils 0.0      Absolute Immature Granulocytes 0.0      Absolute NRBCs 0.0          Abd/pelvis CT,  IV  contrast only TRAUMA / AAA   Final Result   IMPRESSION:    1.  Possible early, uncomplicated appendicitis, recommend surgical   consultation.   2.  No additional visualized explanation for patient's symptoms.      SHANIKA BENAVIDEZ MD            SYSTEM ID:  [id number]          Treatments provided: IV abx.  Family Comments: Wife at bedside.  OBS brochure/video discussed/provided to patient:  No  ED Medications:   Medications   cefTRIAXone (ROCEPHIN) 1 g vial to attach to  mL bag for ADULTS or NS 50 mL bag for PEDS (1 g Intravenous $New Bag 11/29/23 1530)   metroNIDAZOLE (FLAGYL) infusion 500 mg (has no administration in time range)   iopamidol (ISOVUE-370) solution 500 mL (100 mLs Intravenous $Given 11/29/23 1441)   CT scan flush use (65 mLs As instructed $Given 11/29/23 1441)       Drips infusing:  No  For the majority of the shift this patient was Green.   Interventions performed were  NA.    Sepsis treatment initiated: No    Cares/treatment/interventions/medications to be completed following ED care: Admit orders.    ED Nurse Name: Manolo Tesfaye RN  3:41 PM  RECEIVING UNIT ED HANDOFF REVIEW    Above ED Nurse Handoff Report was reviewed: Yes  Reviewed by: Tamera Ramirez RN on November 29, 2023 at 5:14 PM

## 2023-11-29 NOTE — ED TRIAGE NOTES
Pt presents for evaluation of RLQ abdominal pain since last night. Denies associated symptoms. Called nurse line and was advised to come in. When pt lays on his right side and pulls his knees in, this help alleviate the pain. Took 650 mg just PTA.

## 2023-11-29 NOTE — H&P
[facility]  General Surgery H&P    Jhonatan Hewitt MRN# [id number]   Age: 77 year old YOB: 1945     HPI:  The patient has been experiencing abdominal pain for the past 1 day. The pain started yesterday in the RLQ and is currently in the RLQ.  Negative for associated fever, chills, nausea, vomiting, and anorexia. Regular stools though none today. They called the nursing line and were told to go to the ER. Patient ate a snack bar around 3pm while in the ER waiting room.      Review Of Systems:  The 10 point review of systems is negative other than noted in the HPI.    PMH:  Past Medical History:   Diagnosis Date    Cerebral artery occlusion with cerebral infarction (H)     Cramp of limb     DJD (degenerative joint disease)     R knee    Dyslipidemia     Glaucoma     H/O prostatectomy     2009- s/p radiation therapy n 2011    High blood pressure     History of prostate cancer     Impotence of organic origin     Thrombosis        PSH:  Past Surgical History:   Procedure Laterality Date    ARTHROPLASTY KNEE Right 8/15/2023    Procedure: RIGHT TOTAL KNEE ARTHROPLASTY;  Surgeon: Toy Jerry MD;  Location: [facility] OR    CATARACT IOL, RT/LT Right 2014    ORTHOPEDIC SURGERY Left 2002    hip replacement    PHACOEMULSIFICATION CLEAR CORNEA W/ STANDARD IOL IMPLANT, ENDOSCOPIC CYCLOPHOTOCOAGULATION, COMBINED Right 9/25/2014    Procedure: COMBINED PHACOEMULSIFICATION CLEAR CORNEA WITH STANDARD INTRAOCULAR LENS IMPLANT, ENDOSCOPIC CYCLOPHOTOCOAGULATION;  Surgeon: Romain Duran MD;  Location:  [facility]    PROSTATECTOMY SUPRAPUBIC  7/2009       Allergies:  Allergies   Allergen Reactions    Soap & Cleansers Rash     Severe rash from detergent on hospital linens    Amoxicillin-Pot Clavulanate Rash    Latex Rash    Percocet [Oxycodone-Acetaminophen] Rash       Home Medications:  Current Outpatient Medications   Medication Sig Dispense Refill    ascorbic acid (VITAMIN C) 500 MG CPCR Take  1,000 mg by mouth every evening      atorvastatin (LIPITOR) 40 MG tablet Take 40 mg by mouth daily  3    brimonidine (ALPHAGAN) 0.2 % ophthalmic solution Place 1 drop into both eyes 2 times daily      Calcium Carbonate-Vit D-Min (CALCIUM 1200) 2640-6723 MG-UNIT CHEW Take 1 tablet by mouth At Bedtime      dorzolamide-timolol (COSOPT) 2-0.5 % ophthalmic solution Place 1 drop into both eyes 2 times daily  99    doxycycline (VIBRAMYCIN) 100 MG capsule Take 100 mg by mouth 2 times daily as needed For rosacea  99    Glucosamine-Chondroit-Vit C-Mn (GLUCOSAMINE 1500 COMPLEX) CAPS Take 1 capsule by mouth At Bedtime      HYDROmorphone (DILAUDID) 2 MG tablet Take 1-2 tablets (2-4 mg) by mouth every 4 hours as needed for moderate to severe pain 30 tablet 0    latanoprost (XALATAN) 0.005 % ophthalmic solution Place 1 drop into both eyes At Bedtime  99    losartan-hydrochlorothiazide (HYZAAR) 50-12.5 MG per tablet Take 1 tablet by mouth daily  3    multivitamin w/minerals (THERA-VIT-M) tablet Take 1 tablet by mouth every evening      netarsudil (RHOPRESSA) 0.02 % ophthalmic solution Place 1 drop into both eyes At Bedtime      Omega-3 Fatty Acids (FISH OIL) 1200 MG capsule Take 1,200 mg by mouth every evening      senna-docusate (SENOKOT-S/PERICOLACE) 8.6-50 MG tablet Take 1-2 tablets by mouth 2 times daily Take while on oral narcotics to prevent or treat constipation. 30 tablet 0       Social History:  Social History     Tobacco Use    Smoking status: Former     Types: Cigarettes    Smokeless tobacco: Never    Tobacco comments:     quit 1985   Substance Use Topics    Alcohol use: Not Currently     Comment: 0 drinks or beer per week    Drug use: No       Family History:  Family History   Problem Relation Age of Onset    Glaucoma Mother     Retinal detachment Mother     Glaucoma Brother     Glaucoma Other      Physical Exam:  BP (!) 141/81 (BP Location: Left arm)   Pulse 70   Temp 98.4  F (36.9  C) (Oral)   Resp 16   Ht 1.854 m  "(6' 1\")   Wt 111.1 kg (245 lb)   SpO2 97%   BMI 32.32 kg/m    General appearance: Resting Comfortably in bed, no apparent distress  Eyes: conjunctiva clear, pupils equally round and reactive.   Mouth: Mucus membranes moist.  Lungs: Clear to auscultation bilaterally  Heart: regular rate and rhythm, no murmurs, rubs, or gallops  Abdomen: rounded, obese, prior laparoscopic scars noted   Tenderness: RLQ mild, negative rebound tenderness   Masses: none   Organomegaly: none  Extremities: Without clubbing, cyanosis, edema  Neurologic: Grossly intact times four extremities, alert and oriented times three  Psychiatric: Mood and affect are appropriate  Skin: Without lesions or rashes      Labs Reviewed:  Lab Results   Component Value Date    WBC 7.6 11/29/2023     Lab Results   Component Value Date    HGB 15.4 11/29/2023     Lab Results   Component Value Date     11/29/2023     Last Basic Metabolic Panel:  Lab Results   Component Value Date     11/29/2023      Lab Results   Component Value Date    POTASSIUM 4.5 11/29/2023     Lab Results   Component Value Date    CHLORIDE 103 11/29/2023     Lab Results   Component Value Date    KWABENA 8.7 11/29/2023     Lab Results   Component Value Date    CO2 27 11/29/2023     Lab Results   Component Value Date    BUN 15.7 11/29/2023     Lab Results   Component Value Date    CR 0.88 11/29/2023     Lab Results   Component Value Date    GLC 92 11/29/2023     08/16/2023       Radiology:  All imaging studies reviewed by me.    Results for orders placed or performed during the hospital encounter of 11/29/23   Abd/pelvis CT,  IV  contrast only TRAUMA / AAA    Narrative    CT ABDOMEN PELVIS W CONTRAST 11/29/2023 2:50 PM    CLINICAL HISTORY: Right lower quadrant pain    TECHNIQUE: CT scan of the abdomen and pelvis was performed following  injection of IV contrast. Multiplanar reformats were obtained. Dose  reduction techniques were used.  CONTRAST: 100mL " Isovue-370    COMPARISON: None.    FINDINGS:   LOWER CHEST: Unremarkable.    HEPATOBILIARY: Normal.    PANCREAS: Normal.    SPLEEN: Normal.    ADRENAL GLANDS: Normal.    KIDNEYS/BLADDER: Bilateral cortical and renal sinus cysts, no specific  follow-up recommended. No hydronephrosis. Urinary bladder is  decompressed with diffuse wall thickening and possible subtle  surrounding fat stranding.    BOWEL: Appendix is not pathologically dilated, although there is  associated mucosal hyperenhancement and some subtle surrounding fat  stranding. No evidence of gross perforation or associated drainable  fluid collection. No bowel obstruction.    PELVIC ORGANS: Prior prostatectomy.    ADDITIONAL FINDINGS: Scattered calcified atherosclerosis. Mild ectasia  of the celiac artery without definite dissection or surrounding fat  stranding. No lymphadenopathy or ascites.    MUSCULOSKELETAL: No acute bony abnormality. Left hip arthroplasty.      Impression    IMPRESSION:   1.  Possible early, uncomplicated appendicitis, recommend surgical  consultation.  2.  No additional visualized explanation for patient's symptoms.    SHANIKA BENAVIDEZ MD         SYSTEM ID:  [id number]         ASSESSMENT/PLAN:  The patient's history, physical exam, laboratory and imaging studies are suspicious for acute appendicitis. We have discussed laparoscopic appendectomy.  This will be done tomorrow as he ate recently.    We have had a detailed discussion of nature of appendicitis, the procedure, its risks, benefits, alternatives, recovery, postop limitations, anesthesia, bleeding, postoperative infections, injury to adjacent organs and structures, abdominal wall hernia.   All questions have been answered to the best of my ability.       He elects to proceed.      He received ceftriaxone in the ER at 3:30pm  He is scheduled for OR with Dr Mercado tomorrow at 7:30am  NPO at midnight  Restart home eye drops for glaucoma and cozaar for HTN       Isabell Sanchez  MD

## 2023-11-29 NOTE — ED PROVIDER NOTES
"  History     Chief Complaint:  Abdominal Pain    HPI   Jhonatan Hewitt is a 77 year old male with hypertension who is here for evaluation of abdominal pain. Patient reports onset of abdominal pain in the right lower quadrant last night. Pain is alleviated by laying on his right side and pulling his knees in. He currently does not have any pain. Patient has not had previous abdominal surgeries.    Independent Historian:    None    Review of External Notes:  None    Medications:    Diladid   Senna-docusate   Lipitor   Alphagan   Cosopt   Vibramycin   Xalatan   Hyzaar     Past Medical History:    Cerebral artery occlusion with cerebral infarction   Degenerative joint disease   Dyslipidemia  Glaucoma  Hypertension   Prostate cancer   Thrombosis   Osteoarthritis   Situs inversus   TIA     Past Surgical History:    Ear surgery   Hip replacement   Rotator cuff surgery  Right knee arthroplasty   Cataract IOL  Prostatectomy      Physical Exam   Patient Vitals for the past 24 hrs:   BP Temp Temp src Pulse Resp SpO2 Height Weight   11/29/23 1532 -- -- -- -- 18 97 % -- --   11/29/23 1530 (!) 130/90 -- -- 76 -- -- -- --   11/29/23 1214 (!) 143/86 97.7  F (36.5  C) Oral 70 20 97 % 1.854 m (6' 1\") 111.1 kg (245 lb)      Physical Exam  Constitutional: Alert, attentive, GCS 15   Eyes: EOM are normal, anicteric, conjugate gaze  CV: distal extremities warm, well perfused  Chest: Non-labored breathing on RA  GI: Focal right lower quadrant tenderness no rebound no guarding,   Neurological: Alert, attentive, moving all extremities equally.   Skin: Skin is warm and dry.    Emergency Department Course   Imaging:  Abd/pelvis CT,  IV  contrast only TRAUMA / AAA   Final Result   IMPRESSION:    1.  Possible early, uncomplicated appendicitis, recommend surgical   consultation.   2.  No additional visualized explanation for patient's symptoms.      SHANIKA BENAVIDEZ MD            SYSTEM ID:  [id number]        Report per " radiology    Laboratory:  Labs Ordered and Resulted from Time of ED Arrival to Time of ED Departure   COMPREHENSIVE METABOLIC PANEL - Abnormal       Result Value    Sodium 138      Potassium 4.5      Carbon Dioxide (CO2) 27      Anion Gap 8      Urea Nitrogen 15.7      Creatinine 0.88      GFR Estimate 87      Calcium 8.7 (*)     Chloride 103      Glucose 92      Alkaline Phosphatase 80      AST 27      ALT 21      Protein Total 6.6      Albumin 3.8      Bilirubin Total 0.6     ROUTINE UA WITH MICROSCOPIC REFLEX TO CULTURE - Abnormal    Color Urine Yellow      Appearance Urine Clear      Glucose Urine Negative      Bilirubin Urine Negative      Ketones Urine Negative      Specific Gravity Urine 1.025      Blood Urine Negative      pH Urine 6.0      Protein Albumin Urine Negative      Urobilinogen Urine Normal      Nitrite Urine Negative      Leukocyte Esterase Urine Negative      Mucus Urine Present (*)     RBC Urine 1      WBC Urine 1     CBC WITH PLATELETS AND DIFFERENTIAL    WBC Count 7.6      RBC Count 5.31      Hemoglobin 15.4      Hematocrit 47.9      MCV 90      MCH 29.0      MCHC 32.2      RDW 14.3      Platelet Count 188      % Neutrophils 74      % Lymphocytes 15      % Monocytes 9      % Eosinophils 2      % Basophils 0      % Immature Granulocytes 0      NRBCs per 100 WBC 0      Absolute Neutrophils 5.6      Absolute Lymphocytes 1.2      Absolute Monocytes 0.7      Absolute Eosinophils 0.1      Absolute Basophils 0.0      Absolute Immature Granulocytes 0.0      Absolute NRBCs 0.0       Emergency Department Course & Assessments:    Interventions:  Medications   cefTRIAXone (ROCEPHIN) 1 g vial to attach to  mL bag for ADULTS or NS 50 mL bag for PEDS (1 g Intravenous $New Bag 11/29/23 1530)   metroNIDAZOLE (FLAGYL) infusion 500 mg (has no administration in time range)   iopamidol (ISOVUE-370) solution 500 mL (100 mLs Intravenous $Given 11/29/23 1441)   CT scan flush use (65 mLs As instructed $Given  23 1441)      Independent Interpretation (X-rays, CTs, rhythm strip):  None    Consultations/Discussion of Management or Tests:    ED Course as of 23 1544   Wed 2023   1426 I obtained history and examined the patient as noted above.    1525 I rechecked and updated the patient.    1530 I spoke with Dr. Sanchez, general surgeon regarding surgery.      Social Determinants of Health affecting care:  None     Disposition:  The patient was admitted to the hospital under the care of Dr. Sanchez.     Impression & Plan      Medical Decision Makin-year-old past medical history seen for CVA remotely, hypertension presenting for 1 day of right lower abdominal pain.  On exam he had significant tenderness prompting CT imaging which shows probable early acute appendicitis.  He has no evidence of rupture or abscess, he is without fever or leukocytosis.  He was given Septra and Flagyl.  Unfortunately he ate a chocolate bar while awaiting his CT results as such, he will be admitted to general surgery with plan for operative intervention first in the morning.      Diagnosis:    ICD-10-CM    1. Acute appendicitis with localized peritonitis, without perforation, abscess, or gangrene  K35.30          Geoffrey Davis MD  [employer]  6:22 PM 23     Scribe Disclosure:  I, Melony Jenkins, am serving as a scribe at 3:44 PM on 2023 to document services personally performed by Geoffrey Davis MD based on my observations and the provider's statements to me.    Scribe Disclosure:  I, Maryse Santiago, am serving as a scribe  at 3:44 PM on 2023 to document services personally performed by Geoffrey Davis MD based on my observations and the provider's statements to me.    Geoffrey Davis MD  [employer]  3:34 PM 23                Geoffrey Davis MD  23 0624

## 2023-11-30 ENCOUNTER — APPOINTMENT (OUTPATIENT)
Dept: SURGERY | Facility: PHYSICIAN GROUP | Age: 78
End: 2023-11-30
Payer: COMMERCIAL

## 2023-11-30 ENCOUNTER — ANESTHESIA EVENT (OUTPATIENT)
Dept: SURGERY | Facility: CLINIC | Age: 78
End: 2023-11-30
Payer: COMMERCIAL

## 2023-11-30 ENCOUNTER — ANESTHESIA (OUTPATIENT)
Dept: SURGERY | Facility: CLINIC | Age: 78
End: 2023-11-30
Payer: COMMERCIAL

## 2023-11-30 VITALS
BODY MASS INDEX: 32.47 KG/M2 | TEMPERATURE: 97.1 F | DIASTOLIC BLOOD PRESSURE: 92 MMHG | HEART RATE: 67 BPM | RESPIRATION RATE: 12 BRPM | SYSTOLIC BLOOD PRESSURE: 140 MMHG | OXYGEN SATURATION: 96 % | HEIGHT: 73 IN | WEIGHT: 245 LBS

## 2023-11-30 PROCEDURE — 250N000011 HC RX IP 250 OP 636: Performed by: SURGERY

## 2023-11-30 PROCEDURE — 258N000003 HC RX IP 258 OP 636

## 2023-11-30 PROCEDURE — 258N000001 HC RX 258: Performed by: SURGERY

## 2023-11-30 PROCEDURE — 370N000017 HC ANESTHESIA TECHNICAL FEE, PER MIN: Performed by: SURGERY

## 2023-11-30 PROCEDURE — 258N000003 HC RX IP 258 OP 636: Performed by: SURGERY

## 2023-11-30 PROCEDURE — 710N000009 HC RECOVERY PHASE 1, LEVEL 1, PER MIN: Performed by: SURGERY

## 2023-11-30 PROCEDURE — 999N000141 HC STATISTIC PRE-PROCEDURE NURSING ASSESSMENT: Performed by: SURGERY

## 2023-11-30 PROCEDURE — 88304 TISSUE EXAM BY PATHOLOGIST: CPT | Mod: 26

## 2023-11-30 PROCEDURE — G0378 HOSPITAL OBSERVATION PER HR: HCPCS

## 2023-11-30 PROCEDURE — 250N000011 HC RX IP 250 OP 636: Mod: JZ

## 2023-11-30 PROCEDURE — 360N000076 HC SURGERY LEVEL 3, PER MIN: Performed by: SURGERY

## 2023-11-30 PROCEDURE — 44970 LAPAROSCOPY APPENDECTOMY: CPT | Performed by: SURGERY

## 2023-11-30 PROCEDURE — 250N000009 HC RX 250

## 2023-11-30 PROCEDURE — 272N000001 HC OR GENERAL SUPPLY STERILE: Performed by: SURGERY

## 2023-11-30 PROCEDURE — 250N000025 HC SEVOFLURANE, PER MIN: Performed by: SURGERY

## 2023-11-30 PROCEDURE — 710N000012 HC RECOVERY PHASE 2, PER MINUTE: Performed by: SURGERY

## 2023-11-30 PROCEDURE — 88304 TISSUE EXAM BY PATHOLOGIST: CPT | Mod: TC | Performed by: SURGERY

## 2023-11-30 RX ORDER — HYDROMORPHONE HCL IN WATER/PF 6 MG/30 ML
0.2 PATIENT CONTROLLED ANALGESIA SYRINGE INTRAVENOUS EVERY 5 MIN PRN
Status: DISCONTINUED | OUTPATIENT
Start: 2023-11-30 | End: 2023-11-30 | Stop reason: HOSPADM

## 2023-11-30 RX ORDER — ACETAMINOPHEN 325 MG/1
650 TABLET ORAL
Status: DISCONTINUED | OUTPATIENT
Start: 2023-11-30 | End: 2023-11-30 | Stop reason: HOSPADM

## 2023-11-30 RX ORDER — ONDANSETRON 2 MG/ML
INJECTION INTRAMUSCULAR; INTRAVENOUS PRN
Status: DISCONTINUED | OUTPATIENT
Start: 2023-11-30 | End: 2023-11-30

## 2023-11-30 RX ORDER — GLYCOPYRROLATE 0.2 MG/ML
INJECTION, SOLUTION INTRAMUSCULAR; INTRAVENOUS PRN
Status: DISCONTINUED | OUTPATIENT
Start: 2023-11-30 | End: 2023-11-30

## 2023-11-30 RX ORDER — OXYCODONE HYDROCHLORIDE 5 MG/1
5 TABLET ORAL
Status: DISCONTINUED | OUTPATIENT
Start: 2023-11-30 | End: 2023-11-30 | Stop reason: HOSPADM

## 2023-11-30 RX ORDER — HYDROMORPHONE HCL IN WATER/PF 6 MG/30 ML
0.4 PATIENT CONTROLLED ANALGESIA SYRINGE INTRAVENOUS EVERY 5 MIN PRN
Status: DISCONTINUED | OUTPATIENT
Start: 2023-11-30 | End: 2023-11-30 | Stop reason: HOSPADM

## 2023-11-30 RX ORDER — PROPOFOL 10 MG/ML
INJECTION, EMULSION INTRAVENOUS PRN
Status: DISCONTINUED | OUTPATIENT
Start: 2023-11-30 | End: 2023-11-30

## 2023-11-30 RX ORDER — SODIUM CHLORIDE, SODIUM LACTATE, POTASSIUM CHLORIDE, CALCIUM CHLORIDE 600; 310; 30; 20 MG/100ML; MG/100ML; MG/100ML; MG/100ML
INJECTION, SOLUTION INTRAVENOUS CONTINUOUS PRN
Status: DISCONTINUED | OUTPATIENT
Start: 2023-11-30 | End: 2023-11-30

## 2023-11-30 RX ORDER — KETOROLAC TROMETHAMINE 30 MG/ML
INJECTION, SOLUTION INTRAMUSCULAR; INTRAVENOUS PRN
Status: DISCONTINUED | OUTPATIENT
Start: 2023-11-30 | End: 2023-11-30

## 2023-11-30 RX ORDER — LABETALOL HYDROCHLORIDE 5 MG/ML
10 INJECTION, SOLUTION INTRAVENOUS
Status: DISCONTINUED | OUTPATIENT
Start: 2023-11-30 | End: 2023-11-30 | Stop reason: HOSPADM

## 2023-11-30 RX ORDER — FENTANYL CITRATE 50 UG/ML
INJECTION, SOLUTION INTRAMUSCULAR; INTRAVENOUS PRN
Status: DISCONTINUED | OUTPATIENT
Start: 2023-11-30 | End: 2023-11-30

## 2023-11-30 RX ORDER — IBUPROFEN 200 MG
600 TABLET ORAL EVERY 6 HOURS PRN
COMMUNITY
Start: 2023-11-30

## 2023-11-30 RX ORDER — ONDANSETRON 2 MG/ML
4 INJECTION INTRAMUSCULAR; INTRAVENOUS EVERY 30 MIN PRN
Status: DISCONTINUED | OUTPATIENT
Start: 2023-11-30 | End: 2023-11-30 | Stop reason: HOSPADM

## 2023-11-30 RX ORDER — ACETAMINOPHEN 500 MG
500-1000 TABLET ORAL EVERY 6 HOURS PRN
COMMUNITY
Start: 2023-11-30

## 2023-11-30 RX ORDER — SODIUM CHLORIDE, SODIUM LACTATE, POTASSIUM CHLORIDE, CALCIUM CHLORIDE 600; 310; 30; 20 MG/100ML; MG/100ML; MG/100ML; MG/100ML
INJECTION, SOLUTION INTRAVENOUS CONTINUOUS
Status: DISCONTINUED | OUTPATIENT
Start: 2023-11-30 | End: 2023-11-30 | Stop reason: HOSPADM

## 2023-11-30 RX ORDER — CEFAZOLIN SODIUM/WATER 2 G/20 ML
2 SYRINGE (ML) INTRAVENOUS
Status: COMPLETED | OUTPATIENT
Start: 2023-11-30 | End: 2023-11-30

## 2023-11-30 RX ORDER — FENTANYL CITRATE 50 UG/ML
25 INJECTION, SOLUTION INTRAMUSCULAR; INTRAVENOUS EVERY 5 MIN PRN
Status: DISCONTINUED | OUTPATIENT
Start: 2023-11-30 | End: 2023-11-30 | Stop reason: HOSPADM

## 2023-11-30 RX ORDER — FENTANYL CITRATE 50 UG/ML
50 INJECTION, SOLUTION INTRAMUSCULAR; INTRAVENOUS EVERY 5 MIN PRN
Status: DISCONTINUED | OUTPATIENT
Start: 2023-11-30 | End: 2023-11-30 | Stop reason: HOSPADM

## 2023-11-30 RX ORDER — LIDOCAINE HYDROCHLORIDE 20 MG/ML
INJECTION, SOLUTION INFILTRATION; PERINEURAL PRN
Status: DISCONTINUED | OUTPATIENT
Start: 2023-11-30 | End: 2023-11-30

## 2023-11-30 RX ORDER — CEFAZOLIN SODIUM/WATER 2 G/20 ML
2 SYRINGE (ML) INTRAVENOUS SEE ADMIN INSTRUCTIONS
Status: DISCONTINUED | OUTPATIENT
Start: 2023-11-30 | End: 2023-11-30 | Stop reason: HOSPADM

## 2023-11-30 RX ORDER — DEXAMETHASONE SODIUM PHOSPHATE 4 MG/ML
INJECTION, SOLUTION INTRA-ARTICULAR; INTRALESIONAL; INTRAMUSCULAR; INTRAVENOUS; SOFT TISSUE PRN
Status: DISCONTINUED | OUTPATIENT
Start: 2023-11-30 | End: 2023-11-30

## 2023-11-30 RX ORDER — ONDANSETRON 4 MG/1
4 TABLET, ORALLY DISINTEGRATING ORAL EVERY 30 MIN PRN
Status: DISCONTINUED | OUTPATIENT
Start: 2023-11-30 | End: 2023-11-30 | Stop reason: HOSPADM

## 2023-11-30 RX ORDER — BUPIVACAINE HYDROCHLORIDE 5 MG/ML
INJECTION, SOLUTION EPIDURAL; INTRACAUDAL PRN
Status: DISCONTINUED | OUTPATIENT
Start: 2023-11-30 | End: 2023-11-30 | Stop reason: HOSPADM

## 2023-11-30 RX ORDER — HYDROMORPHONE HYDROCHLORIDE 2 MG/1
2-4 TABLET ORAL EVERY 4 HOURS PRN
Qty: 12 TABLET | Refills: 0 | Status: SHIPPED | OUTPATIENT
Start: 2023-11-30

## 2023-11-30 RX ADMIN — SUGAMMADEX 200 MG: 100 INJECTION, SOLUTION INTRAVENOUS at 08:30

## 2023-11-30 RX ADMIN — DEXAMETHASONE SODIUM PHOSPHATE 8 MG: 4 INJECTION, SOLUTION INTRA-ARTICULAR; INTRALESIONAL; INTRAMUSCULAR; INTRAVENOUS; SOFT TISSUE at 07:39

## 2023-11-30 RX ADMIN — PROPOFOL 180 MG: 10 INJECTION, EMULSION INTRAVENOUS at 07:39

## 2023-11-30 RX ADMIN — PHENYLEPHRINE HYDROCHLORIDE 100 MCG: 10 INJECTION INTRAVENOUS at 08:04

## 2023-11-30 RX ADMIN — PROPOFOL 20 MG: 10 INJECTION, EMULSION INTRAVENOUS at 07:47

## 2023-11-30 RX ADMIN — FENTANYL CITRATE 100 MCG: 50 INJECTION INTRAMUSCULAR; INTRAVENOUS at 07:39

## 2023-11-30 RX ADMIN — LIDOCAINE HYDROCHLORIDE 50 MG: 20 INJECTION, SOLUTION INFILTRATION; PERINEURAL at 07:39

## 2023-11-30 RX ADMIN — SODIUM CHLORIDE: 9 INJECTION, SOLUTION INTRAVENOUS at 00:38

## 2023-11-30 RX ADMIN — KETOROLAC TROMETHAMINE 30 MG: 30 INJECTION, SOLUTION INTRAMUSCULAR at 08:27

## 2023-11-30 RX ADMIN — PROPOFOL 50 MG: 10 INJECTION, EMULSION INTRAVENOUS at 08:20

## 2023-11-30 RX ADMIN — Medication 2 G: at 07:34

## 2023-11-30 RX ADMIN — ONDANSETRON 4 MG: 2 INJECTION INTRAMUSCULAR; INTRAVENOUS at 08:27

## 2023-11-30 RX ADMIN — SODIUM CHLORIDE, POTASSIUM CHLORIDE, SODIUM LACTATE AND CALCIUM CHLORIDE: 600; 310; 30; 20 INJECTION, SOLUTION INTRAVENOUS at 07:34

## 2023-11-30 RX ADMIN — PHENYLEPHRINE HYDROCHLORIDE 150 MCG: 10 INJECTION INTRAVENOUS at 08:01

## 2023-11-30 RX ADMIN — PHENYLEPHRINE HYDROCHLORIDE 50 MCG: 10 INJECTION INTRAVENOUS at 07:56

## 2023-11-30 RX ADMIN — GLYCOPYRROLATE 0.2 MG: 0.2 INJECTION, SOLUTION INTRAMUSCULAR; INTRAVENOUS at 07:50

## 2023-11-30 RX ADMIN — PROPOFOL 75 MCG/KG/MIN: 10 INJECTION, EMULSION INTRAVENOUS at 07:50

## 2023-11-30 RX ADMIN — ROCURONIUM BROMIDE 40 MG: 50 INJECTION, SOLUTION INTRAVENOUS at 07:39

## 2023-11-30 ASSESSMENT — ACTIVITIES OF DAILY LIVING (ADL)
ADLS_ACUITY_SCORE: 37

## 2023-11-30 ASSESSMENT — COPD QUESTIONNAIRES: COPD: 0

## 2023-11-30 ASSESSMENT — LIFESTYLE VARIABLES: TOBACCO_USE: 1

## 2023-11-30 NOTE — DISCHARGE INSTRUCTIONS
GENERAL ANESTHESIA OR SEDATION ADULT DISCHARGE INSTRUCTIONS   SPECIAL PRECAUTIONS FOR 24 HOURS AFTER SURGERY    IT IS NOT UNUSUAL TO FEEL LIGHT-HEADED OR FAINT, UP TO 24 HOURS AFTER SURGERY OR WHILE TAKING PAIN MEDICATION.  IF YOU HAVE THESE SYMPTOMS; SIT FOR A FEW MINUTES BEFORE STANDING AND HAVE SOMEONE ASSIST YOU WHEN YOU GET UP TO WALK OR USE THE BATHROOM.    YOU SHOULD REST AND RELAX FOR THE NEXT 24 HOURS AND YOU MUST MAKE ARRANGEMENTS TO HAVE SOMEONE STAY WITH YOU FOR AT LEAST 24 HOURS AFTER YOUR DISCHARGE.  AVOID HAZARDOUS AND STRENUOUS ACTIVITIES.  DO NOT MAKE IMPORTANT DECISIONS FOR 24 HOURS.    DO NOT DRIVE ANY VEHICLE OR OPERATE MECHANICAL EQUIPMENT FOR 24 HOURS FOLLOWING THE END OF YOUR SURGERY.  EVEN THOUGH YOU MAY FEEL NORMAL, YOUR REACTIONS MAY BE AFFECTED BY THE MEDICATION YOU HAVE RECEIVED.    DO NOT DRINK ALCOHOLIC BEVERAGES FOR 24 HOURS FOLLOWING YOUR SURGERY.    DRINK CLEAR LIQUIDS (APPLE JUICE, GINGER ALE, 7-UP, BROTH, ETC.).  PROGRESS TO YOUR REGULAR DIET AS YOU FEEL ABLE.    YOU MAY HAVE A DRY MOUTH, A SORE THROAT, MUSCLES ACHES OR TROUBLE SLEEPING.  THESE SHOULD GO AWAY AFTER 24 HOURS.    CALL YOUR DOCTOR FOR ANY OF THE FOLLOWING:  SIGNS OF INFECTION (FEVER, GROWING TENDERNESS AT THE SURGERY SITE, A LARGE AMOUNT OF DRAINAGE OR BLEEDING, SEVERE PAIN, FOUL-SMELLING DRAINAGE, REDNESS OR SWELLING.    IT HAS BEEN OVER 8 TO 10 HOURS SINCE SURGERY AND YOU ARE STILL NOT ABLE TO URINATE (PASS WATER).      You received Toradol, an IV form of Ibuprofen (Motrin) at 8:30am.  Do not take any Ibuprofen products until 2:30pm.

## 2023-11-30 NOTE — OP NOTE
General Surgery Operative Note      Pre-operative diagnosis: acute appendicitis   Post-operative diagnosis: same    Procedure: laparoscopic appendectomy   Surgeon: Sivan Mercado MD   Assistant(s): None   Anesthesia: General    Estimated blood loss:  Complications: 10 ml  None   Specimens: ID Type Source Tests Collected by Time Destination   1 : Appendix Tissue Appendix SURGICAL PATHOLOGY EXAM Sivan Mercado MD 11/30/2023  8:19 AM         INDICATION FOR PROCEDURE: This is a 77 year old male who presented with abdominal pain of a day's duration. CT scan of the abdomen was consistent with acute appendicitis without evidence for abscess or perforation. We discussed operative management of appendicitis including risks and benefits, and the patient agreed to proceed.    DESCRIPTION OF PROCEDURE:  The patient was placed on the table in supine position.  General endotracheal anesthesia was induced and the abdomen was prepped and draped in standard sterile fashion.  An infraumbilical incision was made and a 12 mm Tiffanie Trocar was placed under direct visualization.  Pneumoperitoneum was established and the abdomen was surveyed. A 5 mm trocar was placed in the suprapubic region, and a 5 mm trocar was placed in the left lower quadrant.  The patient was placed in Trendelenburg and right side up.  The appendix was identified in the right lower quadrant.  It appeared erythematous.  The mesoappendix was divided with a ligasure device.  The base of the appendix was healthy-appearing and was divided using a white load of the endo-DANIEL stapler. The appendix was passed into an Endocatch bag and removed through the 12mm trocar site.  The right lower quadrant was inspected for hemostasis.  Hemostasis was assured.  We irrigated with sterile saline and aspirated the effluent.  The two 5 mm trocars were removed under direct visualization to ensure no bleeding from port sites. The abdomen was evacuated of CO2.  The 12mm port site  fascia was closed with 0 vicryl.  The skin of all 3 incisions was anesthetized with local anesthetic and closed with interrupted 4-0 Vicryl subcuticular sutures and skin glue.  The patient tolerated the procedure well.  Sponge and instrument counts were correct.    FINDINGS: Acute appendicitis without perforation    Sivan Mercado MD

## 2023-11-30 NOTE — PHARMACY-ADMISSION MEDICATION HISTORY
Pharmacist Admission Medication History    Admission medication history is complete. The information provided in this note is only as accurate as the sources available at the time of the update.    Information Source(s): Patient and Family member via in-person    Pertinent Information:     Changes made to PTA medication list:  Added: Losartan  Deleted: Hydromorphone prn, Rivaroxaban (30 days supply post surgery); Losartan/HCTZ  Changed: times on some meds    Prior to Admission medications    Medication Sig Last Dose Taking? Auth Provider Long Term End Date   aspirin (ASA) 325 MG EC tablet Take 325 mg by mouth daily  Yes Unknown, Entered By History No    atorvastatin (LIPITOR) 40 MG tablet Take 40 mg by mouth daily 11/29/2023 Yes Reported, Patient Yes    brimonidine (ALPHAGAN) 0.2 % ophthalmic solution Place 1 drop into both eyes 2 times daily 11/29/2023 at am Yes Reported, Patient     Calcium Carbonate-Vit D-Min (CALCIUM 1200) 9307-1660 MG-UNIT CHEW Take 1 tablet by mouth daily 11/29/2023 Yes Unknown, Entered By History     dorzolamide-timolol (COSOPT) 2-0.5 % ophthalmic solution Place 1 drop into both eyes 2 times daily 11/29/2023 at am Yes Reported, Patient     Glucosamine-Chondroit-Vit C-Mn (GLUCOSAMINE 1500 COMPLEX) CAPS Take 1 capsule by mouth daily 11/27/2023 Yes Unknown, Entered By History     latanoprost (XALATAN) 0.005 % ophthalmic solution Place 1 drop into both eyes At Bedtime 11/28/2023 Yes Reported, Patient Yes    losartan (COZAAR) 50 MG tablet Take 50 mg by mouth daily 11/29/2023 at am Yes Unknown, Entered By History     multivitamin w/minerals (THERA-VIT-M) tablet Take 1 tablet by mouth daily 11/27/2023 Yes Unknown, Entered By History     netarsudil (RHOPRESSA) 0.02 % ophthalmic solution Place 1 drop into both eyes At Bedtime 11/28/2023 Yes Reported, Patient     Omega-3 Fatty Acids (FISH OIL) 1200 MG capsule Take 1,200 mg by mouth daily 11/27/2023 Yes Unknown, Entered By History     vitamin C  (ASCORBIC ACID) 1000 MG TABS Take 1,000 mg by mouth daily 11/27/2023 Yes Unknown, Entered By History     doxycycline (VIBRAMYCIN) 100 MG capsule Take 100 mg by mouth 2 times daily as needed For rosacea prn  Reported, Patient              Allergies reviewed with patient and updates made in EHR: no    Medication History Completed By: Kt Velazquez RPH 11/29/2023 9:07 PM

## 2023-11-30 NOTE — ANESTHESIA PROCEDURE NOTES
Airway       Patient location during procedure: OR       Procedure Start/Stop Times: 11/30/2023 7:45 AM  Staff -        CRNA: Margarita Quiroz APRN CRNA       Performed By: CRNA  Consent for Airway        Urgency: elective  Indications and Patient Condition       Indications for airway management: denise-procedural       Induction type:intravenous       Mask difficulty assessment: 2 - vent by mask + OA or adjuvant +/- NMBA    Final Airway Details       Final airway type: endotracheal airway       Successful airway: ETT - single  Endotracheal Airway Details        ETT size (mm): 7.5       Cuffed: yes       Successful intubation technique: direct laryngoscopy       DL Blade Type: Bernabe 2       Grade View of Cords: 1       Adjucts: stylet       Position: Right       Measured from: gums/teeth       Secured at (cm): 24       Bite block used: None    Post intubation assessment        Placement verified by: capnometry, equal breath sounds and chest rise        Number of attempts at approach: 1       Number of other approaches attempted: 0       Secured with: plastic tape       Ease of procedure: easy       Dentition: Unchanged    Medication(s) Administered   Medication Administration Time: 11/30/2023 7:45 AM

## 2023-11-30 NOTE — PLAN OF CARE
"  Vitals: /79 (BP Location: Left arm)   Pulse 90   Temp 98.8  F (37.1  C) (Oral)   Resp 20   Ht 1.854 m (6' 1\")   Wt 111.1 kg (245 lb)   SpO2 97%   BMI 32.32 kg/m        Resp: LS clear  Neuro:Alert and oriented  GI:Passing flatus  : Voiding w/o dificulty  Skin: No skin alterations noted  Activity: SBA   Diet: NPO  Pain: Denies  CMS: Intact  LDA: PIV NS infusing  Safety check completed: Yes  Patient condition: Stable  Plan: Surgery this morning.    Pt was cleaned/prepared for surgery, left the unit at around 6 AM. Patient's personal bed sheets were use during transfer him to OR as he is allergic to Hospital ones. Or nurse transferred the pt, she stated that she will return the sheets.  "

## 2023-11-30 NOTE — ANESTHESIA POSTPROCEDURE EVALUATION
Patient: Jhonatan GUTIÉRREZ Hobday    Procedure: Procedure(s):  LAPAROSCOPIC APPENDECTOMY       Anesthesia Type:  General    Note:  Disposition: Inpatient   Postop Pain Control:    PONV: No   Neuro/Psych: Uneventful            Sign Out: Acceptable/Baseline neuro status   Airway/Respiratory: Uneventful            Sign Out: Acceptable/Baseline resp. status   CV/Hemodynamics: Uneventful            Sign Out: Acceptable CV status; No obvious hypovolemia; No obvious fluid overload   Other NRE:    DID A NON-ROUTINE EVENT OCCUR? No           Last vitals:  Vitals Value Taken Time   /85 11/30/23 0855   Temp 96  F (35.6  C) 11/30/23 0845   Pulse 66 11/30/23 0857   Resp 18 11/30/23 0845   SpO2 93 % 11/30/23 0857   Vitals shown include unfiled device data.    Electronically Signed By: Michelle Lazcano MD  November 30, 2023  8:58 AM

## 2023-11-30 NOTE — PLAN OF CARE
PRIMARY DIAGNOSIS: ACUTE PAIN  OUTPATIENT/OBSERVATION GOALS TO BE MET BEFORE DISCHARGE:  1. Pain Status: Pain free.    2. Return to near baseline physical activity: No    3. Cleared for discharge by consultants (if involved): No    Discharge Planner Nurse   Safe discharge environment identified: Yes  Barriers to discharge: Yes     Please review provider order for any additional goals.   Nurse to notify provider when observation goals have been met and patient is ready for discharge.

## 2023-11-30 NOTE — ANESTHESIA CARE TRANSFER NOTE
Patient: Jhonatan Hewitt    Procedure: Procedure(s):  LAPAROSCOPIC APPENDECTOMY       Diagnosis: Acute appendicitis with localized peritonitis, without perforation, abscess, or gangrene [K35.30]  Diagnosis Additional Information: No value filed.    Anesthesia Type:   General     Note:    Oropharynx: oropharynx clear of all foreign objects  Level of Consciousness: drowsy  Oxygen Supplementation: face mask  Level of Supplemental Oxygen (L/min / FiO2): 6  Independent Airway: airway patency satisfactory and stable  Dentition: dentition unchanged  Vital Signs Stable: post-procedure vital signs reviewed and stable  Report to RN Given: handoff report given  Patient transferred to: PACU    Handoff Report: Identifed the Patient, Identified the Reponsible Provider, Reviewed the pertinent medical history, Discussed the surgical course, Reviewed Intra-OP anesthesia mangement and issues during anesthesia, Set expectations for post-procedure period and Allowed opportunity for questions and acknowledgement of understanding      Vitals:  Vitals Value Taken Time   /87 11/30/23 0845   Temp     Pulse 69 11/30/23 0845   Resp 18 11/30/23 0845   SpO2 97 % 11/30/23 0845       Electronically Signed By: VERONICA Gray CRNA  November 30, 2023  8:45 AM

## 2023-11-30 NOTE — ANESTHESIA PREPROCEDURE EVALUATION
Anesthesia Pre-Procedure Evaluation    Patient: Jhonatan Hewitt   MRN: [id number] : 1945        Procedure : Procedure(s):  LAPAROSCOPIC APPENDECTOMY          Past Medical History:   Diagnosis Date    Cerebral artery occlusion with cerebral infarction (H)     Cramp of limb     DJD (degenerative joint disease)     R knee    Dyslipidemia     Glaucoma     H/O prostatectomy     - s/p radiation therapy n     High blood pressure     History of prostate cancer     Impotence of organic origin     Thrombosis       Past Surgical History:   Procedure Laterality Date    ARTHROPLASTY KNEE Right 8/15/2023    Procedure: RIGHT TOTAL KNEE ARTHROPLASTY;  Surgeon: Toy Jerry MD;  Location: [facility] Main OR    CATARACT IOL, RT/LT Right     ORTHOPEDIC SURGERY Left     hip replacement    PHACOEMULSIFICATION CLEAR CORNEA W/ STANDARD IOL IMPLANT, ENDOSCOPIC CYCLOPHOTOCOAGULATION, COMBINED Right 2014    Procedure: COMBINED PHACOEMULSIFICATION CLEAR CORNEA WITH STANDARD INTRAOCULAR LENS IMPLANT, ENDOSCOPIC CYCLOPHOTOCOAGULATION;  Surgeon: Romain Duran MD;  Location:  [facility]    PROSTATECTOMY SUPRAPUBIC  2009      Allergies   Allergen Reactions    Soap & Cleansers Rash     Severe rash from detergent on hospital linens    Amoxicillin-Pot Clavulanate Rash    Latex Rash    Percocet [Oxycodone-Acetaminophen] Rash      Social History     Tobacco Use    Smoking status: Former     Types: Cigarettes    Smokeless tobacco: Never    Tobacco comments:     quit    Substance Use Topics    Alcohol use: Not Currently     Comment: 0 drinks or beer per week      Wt Readings from Last 1 Encounters:   23 111.1 kg (245 lb)        Anesthesia Evaluation   Pt has had prior anesthetic. Type: General.    No history of anesthetic complications       ROS/MED HX  ENT/Pulmonary:     (+)                tobacco use, Past use,                   (-) asthma, COPD, sleep apnea and recent URI   Neurologic:     (+)           "CVA,  without deficits,                    Cardiovascular: Comment: Situs inversus    (+) Dyslipidemia hypertension- -   -  - -                                   (-) irregular heartbeat/palpitations and valvular problems/murmurs   METS/Exercise Tolerance:     Hematologic:     (+) History of blood clots,               Musculoskeletal:   (+)  arthritis,             GI/Hepatic:     (+)         appendicitis,        (-) GERD   Renal/Genitourinary:  - neg Renal ROS     Endo:     (+)               Obesity,       Psychiatric/Substance Use:       Infectious Disease:       Malignancy:   (+) Malignancy, History of Prostate.Prostate CA Remission status post Surgery and Radiation.      Other:            Physical Exam    Airway        Mallampati: II   TM distance: > 3 FB   Neck ROM: full   Mouth opening: > 3 cm    Respiratory Devices and Support         Dental       (+) Minor Abnormalities - some fillings, tiny chips      Cardiovascular   cardiovascular exam normal       Rhythm and rate: regular and normal     Pulmonary   pulmonary exam normal        breath sounds clear to auscultation           OUTSIDE LABS:  CBC:   Lab Results   Component Value Date    WBC 7.6 11/29/2023    HGB 15.4 11/29/2023    HGB 13.5 08/16/2023    HCT 47.9 11/29/2023     11/29/2023     BMP:   Lab Results   Component Value Date     11/29/2023    POTASSIUM 4.5 11/29/2023    CHLORIDE 103 11/29/2023    CO2 27 11/29/2023    BUN 15.7 11/29/2023    CR 0.88 11/29/2023    CR 0.89 08/15/2023    GLC 92 11/29/2023     (H) 08/16/2023     COAGS: No results found for: \"PTT\", \"INR\", \"FIBR\"  POC: No results found for: \"BGM\", \"HCG\", \"HCGS\"  HEPATIC:   Lab Results   Component Value Date    ALBUMIN 3.8 11/29/2023    PROTTOTAL 6.6 11/29/2023    ALT 21 11/29/2023    AST 27 11/29/2023    ALKPHOS 80 11/29/2023    BILITOTAL 0.6 11/29/2023     OTHER:   Lab Results   Component Value Date    KWABENA 8.7 (L) 11/29/2023       Anesthesia Plan    ASA Status:  2    NPO " "Status:  NPO Appropriate    Anesthesia Type: General.     - Airway: ETT   Induction: Intravenous.   Maintenance: Balanced.        Consents    Anesthesia Plan(s) and associated risks, benefits, and realistic alternatives discussed. Questions answered and patient/representative(s) expressed understanding.     - Discussed:     - Discussed with:  Patient      - Extended Intubation/Ventilatory Support Discussed: No.      - Patient is DNR/DNI Status: No     Use of blood products discussed: No .     Postoperative Care    Pain management: IV analgesics, Oral pain medications, Multi-modal analgesia.   PONV prophylaxis: Ondansetron (or other 5HT-3), Dexamethasone or Solumedrol     Comments:               Michelle Lazcano MD    I have reviewed the pertinent notes and labs in the chart from the past 30 days and (re)examined the patient.  Any updates or changes from those notes are reflected in this note.             # Drug Induced Platelet Defect: home medication list includes an antiplatelet medication  # Obesity: Estimated body mass index is 32.32 kg/m  as calculated from the following:    Height as of this encounter: 1.854 m (6' 1\").    Weight as of this encounter: 111.1 kg (245 lb).      "

## 2023-12-03 LAB
PATH REPORT.COMMENTS IMP SPEC: NORMAL
PATH REPORT.COMMENTS IMP SPEC: NORMAL
PATH REPORT.FINAL DX SPEC: NORMAL
PATH REPORT.GROSS SPEC: NORMAL
PATH REPORT.MICROSCOPIC SPEC OTHER STN: NORMAL
PATH REPORT.RELEVANT HX SPEC: NORMAL
PHOTO IMAGE: NORMAL

## 2023-12-20 ENCOUNTER — TELEPHONE (OUTPATIENT)
Dept: SURGERY | Facility: CLINIC | Age: 78
End: 2023-12-20
Payer: COMMERCIAL

## 2023-12-20 NOTE — TELEPHONE ENCOUNTER
Attempted to call patient for post op check.  No answer.  Message was left for patient to call back if they had any questions of concerns.     Giselle Willis PA-C

## (undated) DEVICE — SUCTION MANIFOLD NEPTUNE 2 SYS 1 PORT 702-025-000

## (undated) DEVICE — CAST PADDING 6" STERILE 9046S

## (undated) DEVICE — SU VICRYL 0 UR-6 27" J603H

## (undated) DEVICE — STPL POWERED ECHELON 45MM PSEE45A

## (undated) DEVICE — GLOVE UNDER INDICATOR PI SZ 8.5 LF 41685

## (undated) DEVICE — ESU PENCIL W/HOLSTER E2350H

## (undated) DEVICE — Device

## (undated) DEVICE — SU VICRYL 4-0 PS-2 18" UND J496H

## (undated) DEVICE — SOL NACL 0.9% INJ 1000ML BAG 2B1324X

## (undated) DEVICE — LINEN FULL SHEET 5511

## (undated) DEVICE — LINEN TOWEL PACK X10 5473

## (undated) DEVICE — SUCTION IRR STRYKERFLOW II W/TIP 250-070-520

## (undated) DEVICE — BLADE SAW SAGITTAL STRK DUAL CUT 4118-135-090

## (undated) DEVICE — BLADE CLIPPER 3M 9670

## (undated) DEVICE — STPL SKIN PROXIMATE 35 WIDE PMW35

## (undated) DEVICE — GLOVE BIOGEL PI MICRO INDICATOR UNDERGLOVE SZ 6.5 48965

## (undated) DEVICE — GLOVE BIOGEL PI INDICATOR 8.0 LF 41680

## (undated) DEVICE — CUSTOM PACK TOTAL KNEE ACCESSORY SOP5BTAHEA

## (undated) DEVICE — SOL NACL 0.9% IRRIG 1000ML BOTTLE 2F7124

## (undated) DEVICE — GLOVE BIOGEL PI MICRO SZ 6.5 48565

## (undated) DEVICE — HOLDER LIMB VELCRO OR 0814-1533

## (undated) DEVICE — ESU GROUND PAD ADULT W/CORD E7507

## (undated) DEVICE — DECANTER VIAL 2006S

## (undated) DEVICE — ENDO TROCAR FIRST ENTRY KII FIOS Z-THRD 05X100MM CTF03

## (undated) DEVICE — LINEN POUCH DBL 5427

## (undated) DEVICE — DRAPE SHEET REV FOLD 3/4 9349

## (undated) DEVICE — ENDO TROCAR BLUNT TIP KII BALLOON 12X100MM C0R47

## (undated) DEVICE — SUTURE VICRYL+ 2 27IN CP UNDYED VCP195H

## (undated) DEVICE — BAG CLEAR TRASH 1.3M 39X33" P4040C

## (undated) DEVICE — BANDAGE ELASTIC 6X550 LF DBL 593-96LF

## (undated) DEVICE — GOWN IMPERVIOUS ZONED XLG 9041

## (undated) DEVICE — SUTURE VICRYL+ 1 27IN CT-1 UND VCP261H

## (undated) DEVICE — ESU ELEC BLADE 2.75" COATED/INSULATED E1455

## (undated) DEVICE — ENDO POUCH UNIV RETRIEVAL SYSTEM INZII 10MM CD001

## (undated) DEVICE — LINEN HALF SHEET 5512

## (undated) DEVICE — A3 SUPPLIES- SEE NURSING INFO PAGE

## (undated) DEVICE — GLOVE BIOGEL PI ULTRATOUCH G SZ 8.0 42180

## (undated) DEVICE — GLOVE BIOGEL PI ULTRATOUCH G SZ 8.5 42185

## (undated) DEVICE — SU DERMABOND ADVANCED .7ML DNX12

## (undated) DEVICE — ENDO TROCAR SLEEVE KII Z-THREADED 05X100MM CTS02

## (undated) DEVICE — SUCTION MANIFOLD NEPTUNE 2 SYS 4 PORT 0702-020-000

## (undated) DEVICE — CUSTOM PACK TOTAL KNEE SOP5BTKHEC

## (undated) DEVICE — ESU CORD MONOPOLAR 10'  E0510

## (undated) DEVICE — DRESSING MEPILEX AG SILVER 4X12 395990

## (undated) DEVICE — SOL WATER IRRIG 1000ML BOTTLE 2F7114

## (undated) DEVICE — PLATE GROUNDING ADULT W/CORD 9165L

## (undated) DEVICE — SU STRATAFIX PDS PLUS 1 CT-1 18" SXPP1A404

## (undated) DEVICE — SU MONOCRYL 3-0 PS-2 18" UND MCP497G

## (undated) DEVICE — ESU LIGASURE MARYLAND LAPAROSCOPIC SLR/DVDR 5MMX37CM LF1937

## (undated) DEVICE — NDL 22GA 1.5"

## (undated) RX ORDER — LIDOCAINE HYDROCHLORIDE 10 MG/ML
INJECTION, SOLUTION EPIDURAL; INFILTRATION; INTRACAUDAL; PERINEURAL
Status: DISPENSED
Start: 2023-11-30

## (undated) RX ORDER — PROPOFOL 10 MG/ML
INJECTION, EMULSION INTRAVENOUS
Status: DISPENSED
Start: 2023-08-15

## (undated) RX ORDER — FENTANYL CITRATE-0.9 % NACL/PF 10 MCG/ML
PLASTIC BAG, INJECTION (ML) INTRAVENOUS
Status: DISPENSED
Start: 2023-11-30

## (undated) RX ORDER — DEXAMETHASONE SODIUM PHOSPHATE 4 MG/ML
INJECTION, SOLUTION INTRA-ARTICULAR; INTRALESIONAL; INTRAMUSCULAR; INTRAVENOUS; SOFT TISSUE
Status: DISPENSED
Start: 2023-11-30

## (undated) RX ORDER — GLYCOPYRROLATE 0.2 MG/ML
INJECTION, SOLUTION INTRAMUSCULAR; INTRAVENOUS
Status: DISPENSED
Start: 2023-08-15

## (undated) RX ORDER — FENTANYL CITRATE 50 UG/ML
INJECTION, SOLUTION INTRAMUSCULAR; INTRAVENOUS
Status: DISPENSED
Start: 2023-08-15

## (undated) RX ORDER — GLYCOPYRROLATE 0.2 MG/ML
INJECTION INTRAMUSCULAR; INTRAVENOUS
Status: DISPENSED
Start: 2023-11-30

## (undated) RX ORDER — CEFAZOLIN SODIUM/WATER 2 G/20 ML
SYRINGE (ML) INTRAVENOUS
Status: DISPENSED
Start: 2023-11-30

## (undated) RX ORDER — BUPIVACAINE HYDROCHLORIDE 5 MG/ML
INJECTION, SOLUTION EPIDURAL; INTRACAUDAL
Status: DISPENSED
Start: 2023-11-30

## (undated) RX ORDER — LIDOCAINE HYDROCHLORIDE 10 MG/ML
INJECTION, SOLUTION EPIDURAL; INFILTRATION; INTRACAUDAL; PERINEURAL
Status: DISPENSED
Start: 2023-08-15

## (undated) RX ORDER — ONDANSETRON 2 MG/ML
INJECTION INTRAMUSCULAR; INTRAVENOUS
Status: DISPENSED
Start: 2023-11-30

## (undated) RX ORDER — PROPOFOL 10 MG/ML
INJECTION, EMULSION INTRAVENOUS
Status: DISPENSED
Start: 2023-11-30

## (undated) RX ORDER — DEXAMETHASONE SODIUM PHOSPHATE 10 MG/ML
INJECTION, EMULSION INTRAMUSCULAR; INTRAVENOUS
Status: DISPENSED
Start: 2023-08-15

## (undated) RX ORDER — ONDANSETRON 2 MG/ML
INJECTION INTRAMUSCULAR; INTRAVENOUS
Status: DISPENSED
Start: 2023-08-15

## (undated) RX ORDER — FENTANYL CITRATE 50 UG/ML
INJECTION, SOLUTION INTRAMUSCULAR; INTRAVENOUS
Status: DISPENSED
Start: 2023-11-30